# Patient Record
Sex: MALE | Race: WHITE | NOT HISPANIC OR LATINO | Employment: OTHER | URBAN - METROPOLITAN AREA
[De-identification: names, ages, dates, MRNs, and addresses within clinical notes are randomized per-mention and may not be internally consistent; named-entity substitution may affect disease eponyms.]

---

## 2017-01-06 ENCOUNTER — OFFICE VISIT (OUTPATIENT)
Dept: MEDICAL GROUP | Facility: MEDICAL CENTER | Age: 67
End: 2017-01-06
Payer: MEDICARE

## 2017-01-06 VITALS
HEIGHT: 67 IN | SYSTOLIC BLOOD PRESSURE: 128 MMHG | TEMPERATURE: 96.8 F | OXYGEN SATURATION: 94 % | WEIGHT: 238 LBS | DIASTOLIC BLOOD PRESSURE: 74 MMHG | HEART RATE: 74 BPM | BODY MASS INDEX: 37.35 KG/M2

## 2017-01-06 DIAGNOSIS — K57.30 DIVERTICULOSIS OF LARGE INTESTINE WITHOUT HEMORRHAGE: ICD-10-CM

## 2017-01-06 DIAGNOSIS — Z00.00 MEDICARE ANNUAL WELLNESS VISIT, SUBSEQUENT: ICD-10-CM

## 2017-01-06 DIAGNOSIS — I10 ESSENTIAL HYPERTENSION: ICD-10-CM

## 2017-01-06 DIAGNOSIS — Z97.0 PRESENCE OF ARTIFICIAL LEFT EYE: ICD-10-CM

## 2017-01-06 DIAGNOSIS — Z12.5 PROSTATE CANCER SCREENING: ICD-10-CM

## 2017-01-06 DIAGNOSIS — E66.9 OBESITY (BMI 30-39.9): ICD-10-CM

## 2017-01-06 DIAGNOSIS — I45.10 RBBB (RIGHT BUNDLE BRANCH BLOCK): ICD-10-CM

## 2017-01-06 DIAGNOSIS — R74.8 ELEVATED LIVER ENZYMES: ICD-10-CM

## 2017-01-06 DIAGNOSIS — R73.01 ELEVATED FASTING BLOOD SUGAR: ICD-10-CM

## 2017-01-06 DIAGNOSIS — D17.0 LIPOMA OF NECK: ICD-10-CM

## 2017-01-06 DIAGNOSIS — Z23 NEED FOR SHINGLES VACCINE: ICD-10-CM

## 2017-01-06 DIAGNOSIS — R06.83 SNORING: ICD-10-CM

## 2017-01-06 DIAGNOSIS — Z23 NEED FOR PNEUMOCOCCAL VACCINATION: ICD-10-CM

## 2017-01-06 DIAGNOSIS — N52.9 ERECTILE DYSFUNCTION, UNSPECIFIED ERECTILE DYSFUNCTION TYPE: ICD-10-CM

## 2017-01-06 PROCEDURE — 90732 PPSV23 VACC 2 YRS+ SUBQ/IM: CPT | Performed by: FAMILY MEDICINE

## 2017-01-06 PROCEDURE — G0439 PPPS, SUBSEQ VISIT: HCPCS | Mod: 25 | Performed by: FAMILY MEDICINE

## 2017-01-06 PROCEDURE — G0009 ADMIN PNEUMOCOCCAL VACCINE: HCPCS | Performed by: FAMILY MEDICINE

## 2017-01-06 RX ORDER — TADALAFIL 5 MG/1
5 TABLET ORAL DAILY
Qty: 90 TAB | Refills: 3 | Status: SHIPPED | OUTPATIENT
Start: 2017-01-06 | End: 2018-01-22 | Stop reason: SDUPTHER

## 2017-01-06 RX ORDER — AMLODIPINE BESYLATE AND BENAZEPRIL HYDROCHLORIDE 5; 20 MG/1; MG/1
2 CAPSULE ORAL DAILY
Qty: 180 CAP | Refills: 3 | Status: SHIPPED | OUTPATIENT
Start: 2017-01-06 | End: 2017-12-28 | Stop reason: SDUPTHER

## 2017-01-06 RX ORDER — HYDROCHLOROTHIAZIDE 25 MG/1
25 TABLET ORAL DAILY
Qty: 90 TAB | Refills: 3 | Status: SHIPPED | OUTPATIENT
Start: 2017-01-06 | End: 2017-12-28 | Stop reason: SDUPTHER

## 2017-01-06 ASSESSMENT — PATIENT HEALTH QUESTIONNAIRE - PHQ9: CLINICAL INTERPRETATION OF PHQ2 SCORE: 0

## 2017-01-06 NOTE — MR AVS SNAPSHOT
"        Stas Gann   2017 4:00 PM   Office Visit   MRN: [id number]    Department:  [facility]   Dept Phone:  [phone]    Description:  Male : 1950   Provider:  Farhan Guy M.D.           Reason for Visit     Annual Exam           Allergies as of 2017     Allergen Noted Reactions    Nkda [No Known Drug Allergy] 2014         You were diagnosed with     Medicare annual wellness visit, subsequent   [945409]       Essential hypertension   [6565423]       Obesity (BMI 30-39.9)   [314444]       RBBB (right bundle branch block)   [163116]       Elevated fasting blood sugar   [249898]       Elevated liver enzymes   [997916]       Presence of artificial left eye   [662763]       Diverticulosis of large intestine without hemorrhage   [5742699]       Snoring   [264464]       Erectile dysfunction, unspecified erectile dysfunction type   [6527959]       Lipoma of neck   [510709]       Need for pneumococcal vaccination   [149448]       Need for shingles vaccine   [823621]       Prostate cancer screening   [722857]         Vital Signs     Blood Pressure Pulse Temperature Height Weight Body Mass Index    128/74 mmHg 74 36 °C (96.8 °F) 1.702 m (5' 7\") 107.956 kg (238 lb) 37.27 kg/m2    Oxygen Saturation Smoking Status                94% Former Smoker          Basic Information     Date Of Birth Sex Race Ethnicity Preferred Language    1950 Male White Non- English      Problem List              ICD-10-CM Priority Class Noted - Resolved    HTN (hypertension) I10   2014 - Present    Snoring R06.83   2014 - Present    RBBB (right bundle branch block) I45.10   2014 - Present    Presence of artificial left eye Z97.0   2014 - Present    Hx of Elevated liver enzymes R74.8   2014 - Present    Elevated fasting blood sugar R73.01   2014 - Present    ED (erectile dysfunction) N52.9   2014 - Present    Hx of Diverticulosis of colon K57.30   2015 - " Present    Obesity (BMI 30-39.9) E66.9   1/6/2017 - Present    Lipoma of neck D17.0   1/6/2017 - Present      Health Maintenance        Date Due Completion Dates    IMM ZOSTER VACCINE 1/24/2008 ---    IMM PNEUMOCOCCAL 65+ (ADULT) LOW/MEDIUM RISK SERIES (2 of 2 - PPSV23) 12/18/2015 12/18/2014    COLONOSCOPY 8/13/2024 8/13/2014    IMM DTaP/Tdap/Td Vaccine (2 - Td) 9/3/2025 9/3/2015            Current Immunizations     13-VALENT PCV PREVNAR 12/18/2014    Influenza TIV (IM) 9/14/2015    Influenza Vaccine Adult HD 10/31/2016    Pneumococcal polysaccharide vaccine (PPSV-23) 1/6/2017    Tdap Vaccine 9/3/2015  4:21 PM      Below and/or attached are the medications your provider expects you to take. Review all of your home medications and newly ordered medications with your provider and/or pharmacist. Follow medication instructions as directed by your provider and/or pharmacist. Please keep your medication list with you and share with your provider. Update the information when medications are discontinued, doses are changed, or new medications (including over-the-counter products) are added; and carry medication information at all times in the event of emergency situations     Allergies:  NKDA - (reactions not documented)               Medications  Valid as of: January 06, 2017 -  4:55 PM    Generic Name Brand Name Tablet Size Instructions for use    Amlodipine Besy-Benazepril HCl (Cap) LOTREL 5-20 MG Take 2 Caps by mouth every day.        HydroCHLOROthiazide (Tab) HYDRODIURIL 25 MG Take 1 Tab by mouth every day.        Tadalafil (Tab) CIALIS 5 MG Take 1 Tab by mouth every day.        Zoster Vaccine Live (Recon Soln) ZOSTAVAX 35487 UNT/0.65ML Inject 0.65 mL as instructed Once for 1 dose. Please fax when administered so we can update our records        .                 Medicines prescribed today were sent to:     [id number] - [place] - [address]    [address] [place] [address]    Phone:  [phone] Fax: [phone]    Open 24 Hours?: No    [facility] - [place], [place] - [address]    [address] [address]    Phone: [phone] Fax: [phone]    Open 24 Hours?: No      Medication refill instructions:       If your prescription bottle indicates you have medication refills left, it is not necessary to call your provider’s office. Please contact your pharmacy and they will refill your medication.    If your prescription bottle indicates you do not have any refills left, you may request refills at any time through one of the following ways: The online [employer] system (except Urgent Care), by calling your provider’s office, or by asking your pharmacy to contact your provider’s office with a refill request. Medication refills are processed only during regular business hours and may not be available until the next business day. Your provider may request additional information or to have a follow-up visit with you prior to refilling your medication.   *Please Note: Medication refills are assigned a new Rx number when refilled electronically. Your pharmacy may indicate that no refills were authorized even though a new prescription for the same medication is available at the pharmacy. Please request the medicine by name with the pharmacy before contacting your provider for a refill.        Your To Do List     Future Labs/Procedures Complete By Expires    CBC WITH DIFFERENTIAL  As directed 1/7/2018    COMP METABOLIC PANEL  As directed 1/7/2018    HEMOGLOBIN A1C  As directed 1/7/2018    LIPID PROFILE  As directed 1/7/2018    PROSTATE SPECIFIC AG SCREENING  As directed 1/7/2018    TSH WITH REFLEX TO FT4  As directed 1/6/2018      Referral     A referral request has been sent to our patient care coordination department. Please allow 3-5 business days for us to process this request and contact you either by phone or mail. If you do not hear from us by the 5th  business day, please call us at [phone].           [employer] Access Code: Activation code not generated  Current [employer] Status: Active

## 2017-01-07 NOTE — PROGRESS NOTES
Chief Complaint   Patient presents with   • Annual Exam         HPI:  Stas is a 66 y.o. here for Medicare Annual Wellness Visit     Patient has recurrent lipoma on posterior neck that he would like treated. He states it is bothersome.  Patient has been going to the gym and lifting weights and doing cardio regularly. He has lost about 20 pounds in last 2 years.      Patient Active Problem List    Diagnosis Date Noted   • Obesity (BMI 30-39.9) 01/06/2017   • Lipoma of neck 01/06/2017   • Hx of Diverticulosis of colon 01/08/2015   • ED (erectile dysfunction) 12/18/2014   • Hx of Elevated liver enzymes 05/06/2014   • Elevated fasting blood sugar 05/06/2014   • HTN (hypertension) 04/29/2014   • Snoring 04/29/2014   • RBBB (right bundle branch block) 04/29/2014   • Presence of artificial left eye 04/29/2014       Current Outpatient Prescriptions   Medication Sig Dispense Refill   • hydrochlorothiazide (HYDRODIURIL) 25 MG Tab Take 1 Tab by mouth every day. 90 Tab 3   • amlodipine-benazepril (LOTREL) 5-20 MG per capsule Take 2 Caps by mouth every day. 180 Cap 3   • tadalafil (CIALIS) 5 MG tablet Take 1 Tab by mouth every day. 90 Tab 3   • zoster vaccine live, PF, (ZOSTAVAX) [id number] UNT/0.65ML injection Inject 0.65 mL as instructed Once for 1 dose. Please fax when administered so we can update our records 0.65 mL 0     No current facility-administered medications for this visit.            Current supplements as per medication list.       Allergies: Nkda    Current social contact/activities: part of single clubs.    He  reports that he quit smoking about 36 years ago. His smoking use included Cigarettes. He has a 10 pack-year smoking history. He has never used smokeless tobacco. He reports that he does not drink alcohol or use illicit drugs.  Counseling given: Not Answered        DPA/Advanced directive: Patient does not have an advanced directive. If not on file, instructed to bring in a copy to scan into their chart. If no  advanced directive exists, a packet and workshop information was given on advanced directives.    ROS:    Gait: Uses no assistive device   Ostomy: no   Other tubes: no   Amputations: no   Chronic oxygen use no   Last eye exam: few years.   : Denies incontinence.       Depression Screening    Little interest or pleasure in doing things?  0 - not at all  Feeling down, depressed , or hopeless? 0 - not at all  Trouble falling or staying asleep, or sleeping too much?     Feeling tired or having little energy?     Poor appetite or overeating?     Feeling bad about yourself - or that you are a failure or have let yourself or your family down?    Trouble concentrating on things, such as reading the newspaper or watching television?    Moving or speaking so slowly that other people could have noticed.  Or the opposite - being so fidgety or restless that you have been moving around a lot more than usual?     Thoughts that you would be better off dead, or of hurting yourself?     Patient Health Questionnaire Score:      If depressive symptoms identified deferred to follow up visit unless specifically addressed in assesment and plan.      Screening for Cognitive Impairment    Three Minute Recall (banana, sunrise, fence)  3/3    Draw clock face with all 12 numbers set to the hand to show 10 minures past 11 o'clock       Cognitive concerns identified defferred for follow up unless specifically addressed in assesment and plan.    Fall Risk Assessment    Has the patient had two or more falls in the last year or any fall with injury in the last year?  No    Safety Assessment    Throw rugs on floor.  No  Handrails on all stairs.  No  Good lighting in all hallways.  Yes  Difficulty hearing.  No  Patient counseled about all safety risks that were identified.    Functional Assessment ADLs    Are there any barriers preventing you from cooking for yourself or meeting nutritional needs?  No.    Are there any barriers preventing you from  "driving safely or obtaining transportation?  No.    Are there any barriers preventing you from using a telephone or calling for help?  No.    Are there any barriers preventing you from shopping?  No.    Are there any barriers preventing you from taking care of your own finances?  No.    Are there any barriers preventing you from managing your medications?  No.    Are currently engaing any exercise or physical activity?  Yes.       Health Maintenance Summary                IMM ZOSTER VACCINE Overdue 1/24/2008     IMM PNEUMOCOCCAL 65+ (ADULT) LOW/MEDIUM RISK SERIES Overdue 12/18/2015      Done 12/18/2014 Imm Admin: Pneumococcal Conjugate Vaccine (Prevnar/PCV-13)    Annual Wellness Visit Overdue 9/2/2016      Done 9/2/2015 Visit Dx: Encounter for Medicare annual wellness exam    COLONOSCOPY Next Due 8/13/2024      Done 8/13/2014 AMB REFERRAL TO GI FOR COLONOSCOPY    IMM DTaP/Tdap/Td Vaccine Next Due 9/3/2025      Done 9/3/2015 Imm Admin: Tdap Vaccine          Patient Care Team:  Farhan Guy M.D. as PCP - General (Family Medicine)      Social History   Substance Use Topics   • Smoking status: Former Smoker -- 1.00 packs/day for 10 years     Types: Cigarettes     Quit date: 09/02/1980   • Smokeless tobacco: Never Used      Comment: Quit 1980   • Alcohol Use: No     History reviewed. No pertinent family history.  He  has a past medical history of Hypertension.   Past Surgical History   Procedure Laterality Date   • Mass excision general  5/19/2009     Performed by CARSON BRAND at SURGERY SAME DAY [facility] ORS       Exam:     Blood pressure 128/74, pulse 74, temperature 36 °C (96.8 °F), height 1.702 m (5' 7\"), weight 107.956 kg (238 lb), SpO2 94 %. Body mass index is 37.27 kg/(m^2).    Constitutional: Alert, no distress.  Skin: Warm, dry, good turgor, no rashes in visible areas. 5 cm right 3 cm soft, mobile, non-erythematous, nontender soft tissue mass on posterior neck. Moderate sized polyp behind left ear.  Eye: " Equal, round and reactive, conjunctiva clear, lids normal.  ENMT: Lips without lesions, good dentition, oropharynx clear.  Respiratory: Unlabored respiratory effort, lungs clear to auscultation, no wheezes, no ronchi.  Cardiovascular: Normal S1, S2, no murmur, no edema.  Psych: Alert and oriented x3, normal affect and mood.        Assessment and Plan. The following treatment and monitoring plan is recommended:    1. Medicare annual wellness visit, subsequent  - Annual Wellness Visit - Includes PPPS Subsequent ()    2. Essential hypertension  Controlled. Continue current medication. Follow up annually with labs.  - hydrochlorothiazide (HYDRODIURIL) 25 MG Tab; Take 1 Tab by mouth every day.  Dispense: 90 Tab; Refill: 3  - amlodipine-benazepril (LOTREL) 5-20 MG per capsule; Take 2 Caps by mouth every day.  Dispense: 180 Cap; Refill: 3  - COMP METABOLIC PANEL; Future  - LIPID PROFILE; Future  - TSH WITH REFLEX TO FT4; Future  - CBC WITH DIFFERENTIAL; Future  - Annual Wellness Visit - Includes PPPS Subsequent ()    3. Obesity (BMI 30-39.9)  - Patient identified as having weight management issue.  Appropriate orders and counseling given.  - Annual Wellness Visit - Includes PPPS Subsequent ()    4. RBBB (right bundle branch block)  Asymptomatic.  - Annual Wellness Visit - Includes PPPS Subsequent ()    5. Elevated fasting blood sugar  Improved. Continue exercise.  - HEMOGLOBIN A1C; Future  - Annual Wellness Visit - Includes PPPS Subsequent ()    6. Hx of Elevated liver enzymes  - Annual Wellness Visit - Includes PPPS Subsequent ()    7. Presence of artificial left eye  - Annual Wellness Visit - Includes PPPS Subsequent ()    8. Diverticulosis of large intestine without hemorrhage  Asymptomatic.  - Annual Wellness Visit - Includes PPPS Subsequent ()    9. Snoring  Improved after weight loss.  - Annual Wellness Visit - Includes PPPS Subsequent ()    10. Erectile dysfunction,  unspecified erectile dysfunction type  Controlled with Cialis 5 mg daily.  - Annual Wellness Visit - Includes PPPS Subsequent ()    11. Lipoma of neck  - REFERRAL TO DERMATOLOGY  - Annual Wellness Visit - Includes PPPS Subsequent ()    12. Need for pneumococcal vaccination  - PNEUMOCOCCAL POLYSACCHARIDE VACCINE 23-VALENT =>1YO SQ/IM  - Annual Wellness Visit - Includes PPPS Subsequent ()    13. Need for shingles vaccine  - zoster vaccine live, PF, (ZOSTAVAX) [id number] UNT/0.65ML injection; Inject 0.65 mL as instructed Once for 1 dose. Please fax when administered so we can update our records  Dispense: 0.65 mL; Refill: 0  - Annual Wellness Visit - Includes PPPS Subsequent ()    14. Prostate cancer screening  - PROSTATE SPECIFIC AG SCREENING; Future  - Annual Wellness Visit - Includes PPPS Subsequent ()        Services needed: as per orders if indicated.  Health Care Screening: Age-appropriate preventive services Medicare covers discussed today and ordered if indicated.    Referrals offered: Community-based lifestyle interventions to reduce health risks and promote self-management and wellness, fall prevention, nutrition, physical activity, tobacco-use cessation, weight loss, and mental health services as per orders if indicated.    Discussion today about general wellness and lifestyle habits:    · Prevent falls and reduce trip hazards; Cautioned about securing or removing rugs.  · Have a working fire alarm and carbon monoxide detector;   · Engage in regular physical activity and social activities       Follow-up: Return in about 1 year (around 1/6/2018) for Annual, Long.

## 2017-06-07 ENCOUNTER — RX ONLY (OUTPATIENT)
Age: 67
Setting detail: RX ONLY
End: 2017-06-07

## 2017-06-21 PROBLEM — D49.2 NEOPLASM OF UNSPECIFIED BEHAVIOR OF BONE, SOFT TISSUE, AND SKIN: Status: RESOLVED | Noted: 2017-06-07 | Resolved: 2017-06-21

## 2017-07-11 ENCOUNTER — RX ONLY (OUTPATIENT)
Age: 67
Setting detail: RX ONLY
End: 2017-07-11

## 2017-12-22 ENCOUNTER — PATIENT OUTREACH (OUTPATIENT)
Dept: HEALTH INFORMATION MANAGEMENT | Facility: OTHER | Age: 67
End: 2017-12-22

## 2017-12-22 NOTE — PROGRESS NOTES
1. Attempt #: Final    2. HealthConnect Verified: no    3. Verify PCP: yes    4. Care Team Updated:       •   DME Company (gait device, O2, CPAP, etc.): NO       •   Other Specialists (eye doctor, derm, GYN, cardiology, endo, etc): YES    5.  Reviewed/Updated the following with patient:       •   Communication Preference Obtained? YES       •   Preferred Pharmacy? YES       •   Preferred Lab? YES       •   Family History (document living status of immediate family members and if + hx of cancer, diabetes, hypertension, hyperlipidemia, heart attack, stroke) YES. Was Abstract Encounter opened and chart updated? YES // However pt said that he was adopted and doesn't know his family history.    6. [employer] Activation: already active    7. [employer] Fernando: yes    8. Annual Wellness Visit Scheduling  Scheduling Status:Scheduled      9. Care Gap Scheduling (Attempt to Schedule EACH Overdue Care Gap!)     Health Maintenance Due   Topic Date Due   • IMM ZOSTER VACCINE  01/24/2008   • IMM INFLUENZA (1) 09/01/2017        Scheduled patient for Annual Wellness Visit      10. Patient was advised: “This is a free wellness visit. The provider will screen for medical conditions to help you stay healthy. If you have other concerns to address you may be asked to discuss these at a separate visit or there may be an additional fee.”     11. Patient was informed to arrive 15 min prior to their scheduled appointment and bring in their medication bottles.

## 2017-12-28 DIAGNOSIS — I10 ESSENTIAL HYPERTENSION: ICD-10-CM

## 2017-12-28 RX ORDER — AMLODIPINE BESYLATE AND BENAZEPRIL HYDROCHLORIDE 5; 20 MG/1; MG/1
2 CAPSULE ORAL DAILY
Qty: 180 CAP | Refills: 3 | Status: SHIPPED | OUTPATIENT
Start: 2017-12-28 | End: 2018-01-16 | Stop reason: SDUPTHER

## 2017-12-28 RX ORDER — HYDROCHLOROTHIAZIDE 25 MG/1
25 TABLET ORAL DAILY
Qty: 90 TAB | Refills: 3 | Status: SHIPPED | OUTPATIENT
Start: 2017-12-28 | End: 2019-02-21

## 2017-12-28 NOTE — TELEPHONE ENCOUNTER
----- Message from Stas Gann sent at 12/28/2017  8:46 AM PST -----  Regarding: Prescription Question  Contact: [phone]  Hello, can you contact [facility] and renew my meds for my high blood pressure?    Thanks, Stas Gann

## 2018-01-15 ENCOUNTER — TELEPHONE (OUTPATIENT)
Dept: MEDICAL GROUP | Facility: MEDICAL CENTER | Age: 68
End: 2018-01-15

## 2018-01-15 NOTE — TELEPHONE ENCOUNTER
PVP WITH OUTREACH  Future Appointments       Provider Department Center    1/22/2018 2:00 PM Farhan Guy M.D.; [facility]  [facility]          ANNUAL WELLNESS VISIT PRE-VISIT PLANNING     1.  Immunizations were updated in Epic using WebIZ?: Yes       •  WebIZ Recommendations: TD and ZOSTAVAX (Shingles)       •  Is patient due for Tdap? NO       •  Is patient due for Shingles? NO     2.  Specialty Comments was updated with diagnosis information provided by [facility]: NO

## 2018-01-16 ENCOUNTER — PATIENT MESSAGE (OUTPATIENT)
Dept: MEDICAL GROUP | Facility: MEDICAL CENTER | Age: 68
End: 2018-01-16

## 2018-01-16 ENCOUNTER — HOSPITAL ENCOUNTER (OUTPATIENT)
Dept: LAB | Facility: MEDICAL CENTER | Age: 68
End: 2018-01-16
Attending: FAMILY MEDICINE
Payer: MEDICARE

## 2018-01-16 DIAGNOSIS — Z12.5 PROSTATE CANCER SCREENING: ICD-10-CM

## 2018-01-16 DIAGNOSIS — R73.01 ELEVATED FASTING BLOOD SUGAR: ICD-10-CM

## 2018-01-16 DIAGNOSIS — I10 ESSENTIAL HYPERTENSION: ICD-10-CM

## 2018-01-16 LAB
ALBUMIN SERPL BCP-MCNC: 4.5 G/DL (ref 3.2–4.9)
ALBUMIN/GLOB SERPL: 2 G/DL
ALP SERPL-CCNC: 50 U/L (ref 30–99)
ALT SERPL-CCNC: 18 U/L (ref 2–50)
ANION GAP SERPL CALC-SCNC: 7 MMOL/L (ref 0–11.9)
AST SERPL-CCNC: 19 U/L (ref 12–45)
BASOPHILS # BLD AUTO: 1 % (ref 0–1.8)
BASOPHILS # BLD: 0.06 K/UL (ref 0–0.12)
BILIRUB SERPL-MCNC: 0.7 MG/DL (ref 0.1–1.5)
BUN SERPL-MCNC: 15 MG/DL (ref 8–22)
CALCIUM SERPL-MCNC: 9 MG/DL (ref 8.5–10.5)
CHLORIDE SERPL-SCNC: 104 MMOL/L (ref 96–112)
CHOLEST SERPL-MCNC: 158 MG/DL (ref 100–199)
CO2 SERPL-SCNC: 28 MMOL/L (ref 20–33)
CREAT SERPL-MCNC: 1.05 MG/DL (ref 0.5–1.4)
EOSINOPHIL # BLD AUTO: 0.17 K/UL (ref 0–0.51)
EOSINOPHIL NFR BLD: 2.8 % (ref 0–6.9)
ERYTHROCYTE [DISTWIDTH] IN BLOOD BY AUTOMATED COUNT: 39.2 FL (ref 35.9–50)
EST. AVERAGE GLUCOSE BLD GHB EST-MCNC: 120 MG/DL
GLOBULIN SER CALC-MCNC: 2.2 G/DL (ref 1.9–3.5)
GLUCOSE SERPL-MCNC: 115 MG/DL (ref 65–99)
HBA1C MFR BLD: 5.8 % (ref 0–5.6)
HCT VFR BLD AUTO: 46.5 % (ref 42–52)
HDLC SERPL-MCNC: 40 MG/DL
HGB BLD-MCNC: 16 G/DL (ref 14–18)
IMM GRANULOCYTES # BLD AUTO: 0.02 K/UL (ref 0–0.11)
IMM GRANULOCYTES NFR BLD AUTO: 0.3 % (ref 0–0.9)
LDLC SERPL CALC-MCNC: 92 MG/DL
LYMPHOCYTES # BLD AUTO: 2.45 K/UL (ref 1–4.8)
LYMPHOCYTES NFR BLD: 40.9 % (ref 22–41)
MCH RBC QN AUTO: 29 PG (ref 27–33)
MCHC RBC AUTO-ENTMCNC: 34.4 G/DL (ref 33.7–35.3)
MCV RBC AUTO: 84.2 FL (ref 81.4–97.8)
MONOCYTES # BLD AUTO: 0.57 K/UL (ref 0–0.85)
MONOCYTES NFR BLD AUTO: 9.5 % (ref 0–13.4)
NEUTROPHILS # BLD AUTO: 2.72 K/UL (ref 1.82–7.42)
NEUTROPHILS NFR BLD: 45.5 % (ref 44–72)
NRBC # BLD AUTO: 0 K/UL
NRBC BLD-RTO: 0 /100 WBC
PLATELET # BLD AUTO: 227 K/UL (ref 164–446)
PMV BLD AUTO: 10.2 FL (ref 9–12.9)
POTASSIUM SERPL-SCNC: 3.9 MMOL/L (ref 3.6–5.5)
PROT SERPL-MCNC: 6.7 G/DL (ref 6–8.2)
PSA SERPL-MCNC: 6.27 NG/ML (ref 0–4)
RBC # BLD AUTO: 5.52 M/UL (ref 4.7–6.1)
SODIUM SERPL-SCNC: 139 MMOL/L (ref 135–145)
TRIGL SERPL-MCNC: 128 MG/DL (ref 0–149)
TSH SERPL DL<=0.005 MIU/L-ACNC: 2.8 UIU/ML (ref 0.38–5.33)
WBC # BLD AUTO: 6 K/UL (ref 4.8–10.8)

## 2018-01-16 PROCEDURE — 84443 ASSAY THYROID STIM HORMONE: CPT

## 2018-01-16 PROCEDURE — 80061 LIPID PANEL: CPT

## 2018-01-16 PROCEDURE — 85025 COMPLETE CBC W/AUTO DIFF WBC: CPT

## 2018-01-16 PROCEDURE — 84153 ASSAY OF PSA TOTAL: CPT | Mod: GA

## 2018-01-16 PROCEDURE — 83036 HEMOGLOBIN GLYCOSYLATED A1C: CPT | Mod: GA

## 2018-01-16 PROCEDURE — 36415 COLL VENOUS BLD VENIPUNCTURE: CPT | Mod: GA

## 2018-01-16 PROCEDURE — 80053 COMPREHEN METABOLIC PANEL: CPT

## 2018-01-16 RX ORDER — AMLODIPINE BESYLATE AND BENAZEPRIL HYDROCHLORIDE 5; 20 MG/1; MG/1
2 CAPSULE ORAL DAILY
Qty: 180 CAP | Refills: 0 | Status: SHIPPED | OUTPATIENT
Start: 2018-01-16 | End: 2018-01-16 | Stop reason: SDUPTHER

## 2018-01-16 NOTE — TELEPHONE ENCOUNTER
Demarcus Mcclelland I sent in a request for the Amlodipine to [facility]. Submitted to Dr. Guy for approval

## 2018-01-16 NOTE — TELEPHONE ENCOUNTER
From: Stas Gann  To: Farhan Guy M.D.  Sent: 1/16/2018 3:38 PM PST  Subject: Prescription Question    Hello, [facility] will be contacting you for my refill of AMLODIPINE/BENAZPRIL     Thanks, Stas Gann

## 2018-01-17 RX ORDER — AMLODIPINE BESYLATE AND BENAZEPRIL HYDROCHLORIDE 5; 20 MG/1; MG/1
CAPSULE ORAL
Qty: 180 CAP | Refills: 3 | Status: SHIPPED | OUTPATIENT
Start: 2018-01-17 | End: 2018-02-20 | Stop reason: SDUPTHER

## 2018-01-17 NOTE — TELEPHONE ENCOUNTER
Refill done. Patient is due for annual appointment. Please have patient schedule.  Farhan Guy M.D.

## 2018-01-22 ENCOUNTER — OFFICE VISIT (OUTPATIENT)
Dept: MEDICAL GROUP | Facility: MEDICAL CENTER | Age: 68
End: 2018-01-22
Payer: MEDICARE

## 2018-01-22 VITALS
DIASTOLIC BLOOD PRESSURE: 80 MMHG | TEMPERATURE: 97.6 F | HEIGHT: 67 IN | HEART RATE: 68 BPM | BODY MASS INDEX: 40.49 KG/M2 | SYSTOLIC BLOOD PRESSURE: 138 MMHG | WEIGHT: 258 LBS | OXYGEN SATURATION: 96 %

## 2018-01-22 DIAGNOSIS — K57.30 DIVERTICULOSIS OF COLON: ICD-10-CM

## 2018-01-22 DIAGNOSIS — Z97.0 PRESENCE OF ARTIFICIAL LEFT EYE: ICD-10-CM

## 2018-01-22 DIAGNOSIS — R73.01 ELEVATED FASTING BLOOD SUGAR: ICD-10-CM

## 2018-01-22 DIAGNOSIS — Z00.00 MEDICARE ANNUAL WELLNESS VISIT, SUBSEQUENT: ICD-10-CM

## 2018-01-22 DIAGNOSIS — R06.83 SNORING: ICD-10-CM

## 2018-01-22 DIAGNOSIS — I10 ESSENTIAL HYPERTENSION: ICD-10-CM

## 2018-01-22 DIAGNOSIS — I45.10 RBBB (RIGHT BUNDLE BRANCH BLOCK): ICD-10-CM

## 2018-01-22 DIAGNOSIS — N52.9 ERECTILE DYSFUNCTION, UNSPECIFIED ERECTILE DYSFUNCTION TYPE: ICD-10-CM

## 2018-01-22 DIAGNOSIS — R74.8 ELEVATED LIVER ENZYMES: ICD-10-CM

## 2018-01-22 DIAGNOSIS — E66.01 MORBID OBESITY WITH BMI OF 40.0-44.9, ADULT (HCC): ICD-10-CM

## 2018-01-22 DIAGNOSIS — R97.20 ELEVATED PSA, LESS THAN 10 NG/ML: ICD-10-CM

## 2018-01-22 PROBLEM — E66.9 OBESITY (BMI 30-39.9): Status: RESOLVED | Noted: 2017-01-06 | Resolved: 2018-01-22

## 2018-01-22 PROBLEM — D17.0 LIPOMA OF NECK: Status: RESOLVED | Noted: 2017-01-06 | Resolved: 2018-01-22

## 2018-01-22 PROCEDURE — G0439 PPPS, SUBSEQ VISIT: HCPCS | Performed by: FAMILY MEDICINE

## 2018-01-22 RX ORDER — ASCORBIC ACID 500 MG
500 TABLET ORAL DAILY
COMMUNITY
End: 2022-03-23

## 2018-01-22 RX ORDER — TADALAFIL 5 MG/1
5 TABLET ORAL DAILY
Qty: 90 TAB | Refills: 3 | Status: SHIPPED | OUTPATIENT
Start: 2018-01-22 | End: 2019-02-21 | Stop reason: SDUPTHER

## 2018-01-22 ASSESSMENT — PATIENT HEALTH QUESTIONNAIRE - PHQ9: CLINICAL INTERPRETATION OF PHQ2 SCORE: 0

## 2018-01-22 NOTE — PROGRESS NOTES
Chief Complaint   Patient presents with   • Annual Wellness Visit         HPI:  Stas is a 67 y.o. here for Medicare Annual Wellness Visit        Patient Active Problem List    Diagnosis Date Noted   • Morbid obesity with BMI of 40.0-44.9, adult (HCC) 01/22/2018   • Elevated PSA, less than 10 ng/ml 01/22/2018   • Hx of Diverticulosis of colon 01/08/2015   • ED (erectile dysfunction) 12/18/2014   • Hx of Elevated liver enzymes 05/06/2014   • Elevated fasting blood sugar 05/06/2014   • HTN (hypertension) 04/29/2014   • Snoring 04/29/2014   • RBBB (right bundle branch block) 04/29/2014   • Presence of artificial left eye 04/29/2014       Current Outpatient Prescriptions   Medication Sig Dispense Refill   • ascorbic acid (ASCORBIC ACID) 500 MG Tab Take 500 mg by mouth every day.     • tadalafil (CIALIS) 5 MG tablet Take 1 Tab by mouth every day. 90 Tab 3   • amlodipine-benazepril (LOTREL) 5-20 MG per capsule TAKE 2 CAPSULES DAILY 180 Cap 3   • hydrochlorothiazide (HYDRODIURIL) 25 MG Tab Take 1 Tab by mouth every day. 90 Tab 3     No current facility-administered medications for this visit.         Patient is taking medications as noted in medication list.  Current supplements as per medication list.     Allergies: Nkda [no known drug allergy]    Current social contact/activities: pt keeps himself busy with a singles group.     Is patient current with immunizations? No, due for ZOSTAVAX (Shingles). Patient is interested in receiving NONE today.    He  reports that he quit smoking about 37 years ago. His smoking use included Cigarettes. He has a 10.00 pack-year smoking history. He has never used smokeless tobacco. He reports that he does not drink alcohol or use drugs.  Counseling given: Not Answered        DPA/Advanced directive: Patient does not have an Advanced Directive.  A packet and workshop information was given on Advanced Directives.    ROS:    Gait: Uses no assistive device   Ostomy: no   Other tubes: no    Amputations: no   Chronic oxygen use no   Last eye exam 5 years ago    Wears hearing aids: no   : Denies any urinary leakage during the last 6 months        Depression Screening    Little interest or pleasure in doing things?  0 - not at all  Feeling down, depressed, or hopeless? 0 - not at all  Patient Health Questionnaire Score: 0    If depressive symptoms identified deferred to follow up visit unless specifically addressed in assessment and plan.    Interpretation of PHQ-9 Total Score   Score Severity   1-4 No Depression   5-9 Mild Depression   10-14 Moderate Depression   15-19 Moderately Severe Depression   20-27 Severe Depression    Screening for Cognitive Impairment    Three Minute Recall (apple, watch, sergey)  3/3    Draw clock face with all 12 numbers set to the hand to show 10 minutes past 11 o'clock  1 3/5  If cognitive concerns identified, deferred for follow up unless specifically addressed in assessment and plan.    Fall Risk Assessment    Has the patient had two or more falls in the last year or any fall with injury in the last year?  No  If fall risk identified, deferred for follow up unless specifically addressed in assessment and plan.    Safety Assessment    Throw rugs on floor.  Yes  Handrails on all stairs.  Yes  Good lighting in all hallways.  Yes  Difficulty hearing.  No  Patient counseled about all safety risks that were identified.    Functional Assessment ADLs    Are there any barriers preventing you from cooking for yourself or meeting nutritional needs?  No.    Are there any barriers preventing you from driving safely or obtaining transportation?  No.    Are there any barriers preventing you from using a telephone or calling for help?  No.    Are there any barriers preventing you from shopping?  No.    Are there any barriers preventing you from taking care of your own finances?  No.    Are there any barriers preventing you from managing your medications?  No.    Are you currently  "engaging any exercise or physical activity?  Yes.  Pt goes to [employer] 3-4 times a week.     Health Maintenance Summary                IMM ZOSTER VACCINE Overdue 1/24/2008     Annual Wellness Visit Overdue 1/7/2018      Done 1/6/2017 Visit Dx: Medicare annual wellness visit, subsequent     Patient has more history with this topic...    COLONOSCOPY Next Due 8/13/2024      Done 8/13/2014 AMB REFERRAL TO GI FOR COLONOSCOPY    IMM DTaP/Tdap/Td Vaccine Next Due 9/3/2025      Done 9/3/2015 Imm Admin: Tdap Vaccine          Patient Care Team:  Farhan Guy M.D. as PCP - General (Family Medicine)    Social History   Substance Use Topics   • Smoking status: Former Smoker     Packs/day: 1.00     Years: 10.00     Types: Cigarettes     Quit date: 9/2/1980   • Smokeless tobacco: Never Used      Comment: Quit 1980   • Alcohol use No     History reviewed. No pertinent family history.  He  has a past medical history of Hypertension.   Past Surgical History:   Procedure Laterality Date   • MASS EXCISION GENERAL  5/19/2009    Performed by CARSON BRAND at SURGERY SAME DAY [facility] ORS           Exam:     Blood pressure 138/80, pulse 68, temperature 36.4 °C (97.6 °F), height 1.702 m (5' 7\"), weight 117 kg (258 lb), SpO2 96 %. Body mass index is 40.41 kg/m².    Constitutional: Alert, no distress.  Skin: Warm, dry, good turgor, no rashes in visible areas.  Eye: Equal, round and reactive, conjunctiva clear, lids normal.  ENMT: Lips without lesions, good dentition, oropharynx clear.  Neck: Trachea midline, no masses, no thyromegaly. No cervical or supraclavicular lymphadenopathy  Respiratory: Unlabored respiratory effort, lungs clear to auscultation, no wheezes, no ronchi.  Cardiovascular: Normal S1, S2, no murmur, no edema.  Abdomen: Soft, non-tender, no masses, no hepatosplenomegaly.  Psych: Alert and oriented x3, normal affect and mood.      Assessment and Plan. The following treatment and monitoring plan is recommended:    1. " Medicare annual wellness visit, subsequent  - Annual Wellness Visit - Includes PPPS Subsequent ()    2. Morbid obesity with BMI of 40.0-44.9, adult (CMS-[facility])  - Patient identified as having weight management issue.  Appropriate orders and counseling given.  - REFERRAL TO [facility] IMPROVEMENT PROGRAMS (HIP) Services Requested: Registered Dietitian Medicare Obesity Counseling; Reason for Visit: Overweight/Obesity  - Annual Wellness Visit - Includes PPPS Subsequent ()    3. Essential hypertension  Controlled. Continue current medications.  - Annual Wellness Visit - Includes PPPS Subsequent ()    4. Elevated fasting blood sugar  Prediabetic on labs. Advised patient to monitor diet and exercise more.  - Annual Wellness Visit - Includes PPPS Subsequent ()    5. Elevated PSA, less than 10 ng/ml  Elevation of 6.3 PSA. No history of previous PSA in our record. Referral to urology.  - REFERRAL TO UROLOGY  - Annual Wellness Visit - Includes PPPS Subsequent ()    6. Presence of artificial left eye  Continue to monitor.  - Annual Wellness Visit - Includes PPPS Subsequent ()    7. RBBB (right bundle branch block)  Asymptomatic. Continue to monitor.  - Annual Wellness Visit - Includes PPPS Subsequent ()    8. Hx of Diverticulosis of colon  Asymptomatic. Continue to monitor.  - Annual Wellness Visit - Includes PPPS Subsequent ()    9. Hx of Elevated liver enzymes  No evidence of recurrence on labs.  - Annual Wellness Visit - Includes PPPS Subsequent ()    10. Erectile dysfunction, unspecified erectile dysfunction type  Prescription for Cialis printed off.  - Annual Wellness Visit - Includes PPPS Subsequent ()  - tadalafil (CIALIS) 5 MG tablet; Take 1 Tab by mouth every day.  Dispense: 90 Tab; Refill: 3    11. Snoring  Not bothering patient. Advised weight loss.  - Annual Wellness Visit - Includes PPPS Subsequent ()        Services suggested: No services needed at this  time  Health Care Screening recommendations as per orders if indicated.  Referrals offered: PT/OT/Nutrition counseling/Behavioral Health/Smoking cessation as per orders if indicated.    Discussion today about general wellness and lifestyle habits:    · Prevent falls and reduce trip hazards; Cautioned about securing or removing rugs.  · Have a working fire alarm and carbon monoxide detector;   · Engage in regular physical activity and social activities       Follow-up: Return in about 1 year (around 1/22/2019) for Annual.

## 2018-01-25 ENCOUNTER — NON-PROVIDER VISIT (OUTPATIENT)
Dept: MEDICAL GROUP | Facility: MEDICAL CENTER | Age: 68
End: 2018-01-25
Payer: MEDICARE

## 2018-01-25 VITALS — BODY MASS INDEX: 40.81 KG/M2 | HEIGHT: 67 IN | WEIGHT: 260 LBS

## 2018-01-25 PROCEDURE — G0447 BEHAVIOR COUNSEL OBESITY 15M: HCPCS | Performed by: INTERNAL MEDICINE

## 2018-01-25 NOTE — PROGRESS NOTES
"IBT INITIAL ASSESMENT    Author: Merary Goldstein Date & Time created: 1/25/2018  11:15 AM   Visit #: 1  Referring Provider: Farhan Guy M.D.  Patient Age: 68 y.o.  Time in/Out:   10:42-11:12am     ASSESS:  Vitals:    01/25/18 1115   Weight: 117.9 kg (260 lb)   Height: 1.702 m (5' 7\")      Vitals:    01/25/18 1115   Weight: 117.9 kg (260 lb)   Height: 1.702 m (5' 7\")    Body mass index is 40.72 kg/m².   Goal Weight:   NA     The patient states health, and preventing diabetes as motivators for weight loss and has not tried any diets for weight loss in the past. Did start to exercise 1 year ago and go a ; lost nearly 60lb but has gained a lot of it back.   Comorbidities include Elevated fasting glucose, HTN, diverticulosis      Current Dietary/Exercise Habits  1.  How many servings of fruits and vegetables do you eat in a typical day?  2 each or less   2. How many servings of whole grains do you eat in a typical day?   Sourdough bread. Half or less are whole grains   3. How many times in a typical week do you eat foods high in fat or eat at a fast food restaurant?  Rarely eats out. Cooks for himself at home.   4. How many times in a typical week do you eat red meat, pork, or processed meat?  2-3   5. How many days a week do you participate in moderately intense physical activity for 30 minutes?  Goes to the gym and walks 1 mile and does 15mins weights Or walks 1mile outside 3 days a week.   6. How many days a week do you participate in vigorously intense physical activity for 20 minutes?  None   7. How many days a week do you do strength training exercise?  None   8. How many meals and snacks do you eat in a typical day? 2 meals, often skips breakfast. Rarely snacks. Fills plate and often goes back for seconds/   9. Nutrition History/other: Since being told he has pre diabetes he has cut back on sugar. Adds half and half to coffee 2T x2-3 a day (~60kcal).  Pt does drink soda occasionally.     Estimated Stage of " Change   Contemplation as evidenced by stating he is not sure why he has gained weight.    ADVISE:    Physical Activity:  Pt is active 3 days a week. Explained benefits of exercise. Pt is not burning significant amount of calories with his current regimen. We will eventually discuss increasing this.      Dietary Guidelines:   Reviewed diet hx today. Encouraged pt to eat 3 meals a day , not to skip breakfast. We discussed healthy breakfast ideas (2-3 eggs, 1 slice toast, 1 tsp butter or 2 T avocado) Or he can make a protein shake: 1/2 cup plain greek yogurt, 1/2 fruit/berries, almond milk, and leafy greens/ veggies. Keeping a food journal will also help with mindful eating. Recommended hand written or to try my fitness pal.     The patient has been advised of how weight management and physical activity impacts their health and will help to reduce complications and health risk factors.    AGREE:  Goals:   1. Keep a 3-5 day food diary of all foods/drinks consumed, the amount you consumed (approximately), and the time it was when consumed. Try using My [email or web address] Pal. Estimated calorie needs= 1800-1900kcal per day   2.  Stop drinking soda or any sweetened beverage  3. Eat 3 meals a day; do not skip breakfast   4. Continue to exercise at least 3 days per week. Walk 1 mile + weights.     ASSIST:  Based on Kaushik's diet hx, it seems as though he is consuming excess calories from large portions, especially at dinner. He states he is not a snacker, but does occasionally have a granola bar. He skips breakfast, or has high carb meal in AM of instant oatmeal. Recommended higher protein meal and less carbs, given elevated fasting blood sugar.   First pt will focus on calories; aiming to stay <1900kcal per day. Next visit we will go over macronutrient targets if he chooses to continue using the [employer] genesis/website.     ARRANGE:     Return for follow-up in 2 weeks    The patient was assisted in making follow-up appointment per  orders.

## 2018-02-08 ENCOUNTER — NON-PROVIDER VISIT (OUTPATIENT)
Dept: MEDICAL GROUP | Facility: MEDICAL CENTER | Age: 68
End: 2018-02-08
Payer: MEDICARE

## 2018-02-08 VITALS — WEIGHT: 257.6 LBS | BODY MASS INDEX: 40.43 KG/M2 | HEIGHT: 67 IN

## 2018-02-08 PROCEDURE — G0447 BEHAVIOR COUNSEL OBESITY 15M: HCPCS | Performed by: INTERNAL MEDICINE

## 2018-02-08 NOTE — PROGRESS NOTES
"2/8/2018     Visit #2      Referring Provider: Farhan Guy M.D. Robert Tedmadison Gann 68 y.o.           Time in/Out:   10:42-11:08am     ASSESS:    Vitals:    02/08/18 1110   Weight: 116.8 kg (257 lb 9.6 oz)   Height: 1.702 m (5' 7\")            Wt Readings from Last 2 Encounters:   02/08/18 116.8 kg (257 lb 9.6 oz)   01/25/18 117.9 kg (260 lb)            Body mass index is 40.35 kg/m².       Weight change since last visit:   -2.4lb      Starting weight:   260lb       Total weight change:  -2.4lb in 2 weeks      Current Dietary/Exercise Habits:   Pt states he has been eating about the same. Is maybe more cautious of what he is eating bc he is witting it all down on paper. Is having breakfast most often but not daily. Is not always hungry in the mornings.   Diet hx: He is having 1/2 sandwich for lunches on sourdugh or tuna on lettuce. Dinners are his main meal -- meat, veggie and starch usually a potato with butter.     Estimated Stage of Change:  Preparation as evidenced by agreeing to goals and recommendations .      ADVISE:    Physical Activity:  Pt has been active daily with either walking or bowling or at the gym x1 hr      Dietary Guidelines:   Today we discussed the Plate Method for meal planning and balance meals with vegetables, carbohydrates, protein and healthy fats. We also discussed portion control and how to use your hand has a tool for estimating portion sizes.  Recommended pt limit carbs to 1/2 cup per meal and 2 fats per meal with 1/2 plate veggies and 4oz protein.     The patient has been advised of how weight management and physical activity impacts their health and will help to reduce complications and health risk factors.        AGREE:  Previous Goals:   1. Keep a 3-5 day food diary of all foods/drinks consumed, the amount you consumed (approximately), and the time it was when consumed. Try using My [employer] Pal. Estimated calorie needs= 1800-1900kcal per day   2.  Stop drinking soda or " any sweetened beverage  3. Eat 3 meals a day; do not skip breakfast   4. Continue to exercise at least 3 days per week. Walk 1 mile + weights.     New Goals:   1. Continue hand written food journal   2. Eat breakfast daily   3. Use the plate method for portion control and macronutrient balance  4oz protein, 2 fats, 1/2 plate ns veggies, and up to 1/2 cup starch per meal       ASSIST:  Kaushik has lost some weight despite not recognizing any specific changes to his diet except for eating breakfast more regularly. Recommended pt focus first on portion control and then next visit we will go over nutrition basics and discuss nutrient quality. As well we will need to discuss healthier snacks; as he is currently eating chips and crackers. Would like him to focus on less processed carbs and incorporate more veggies, fruit and protein.       ARRANGE:     Return for follow-up in 2 weeks      The patient was assisted in making follow-up appointment per orders.

## 2018-02-20 DIAGNOSIS — I10 ESSENTIAL HYPERTENSION: ICD-10-CM

## 2018-02-20 RX ORDER — AMLODIPINE BESYLATE AND BENAZEPRIL HYDROCHLORIDE 5; 20 MG/1; MG/1
2 CAPSULE ORAL DAILY
Qty: 180 CAP | Refills: 3 | Status: SHIPPED | OUTPATIENT
Start: 2018-02-20 | End: 2018-02-21

## 2018-02-20 NOTE — TELEPHONE ENCOUNTER
Was the patient seen in the last year in this department? Yes     Does patient have an active prescription for medications requested? Yes     Received Request Via: Patient     Patient states that [facility] did not receive prescription,

## 2018-02-21 ENCOUNTER — PATIENT MESSAGE (OUTPATIENT)
Dept: MEDICAL GROUP | Facility: MEDICAL CENTER | Age: 68
End: 2018-02-21

## 2018-02-21 DIAGNOSIS — I10 ESSENTIAL HYPERTENSION: ICD-10-CM

## 2018-02-21 RX ORDER — AMLODIPINE BESYLATE 10 MG/1
10 TABLET ORAL DAILY
Qty: 90 TAB | Refills: 3 | Status: SHIPPED | OUTPATIENT
Start: 2018-02-21 | End: 2019-01-28 | Stop reason: SDUPTHER

## 2018-02-21 RX ORDER — BENAZEPRIL HYDROCHLORIDE 40 MG/1
40 TABLET ORAL DAILY
Qty: 90 TAB | Refills: 3 | Status: SHIPPED | OUTPATIENT
Start: 2018-02-21 | End: 2019-01-28 | Stop reason: SDUPTHER

## 2018-02-21 NOTE — TELEPHONE ENCOUNTER
From: Stas Gann  To: Farhan Guy M.D.  Sent: 2/21/2018 8:17 AM PST  Subject: Prescription Question    Hello, I spoke with [facility], they said my prescription for amlodipine/benazepril 5/20mg was not in stock. Could Doctor Malena request a similar medication for me?    Thank you Kaushik Gann

## 2018-02-22 ENCOUNTER — NON-PROVIDER VISIT (OUTPATIENT)
Dept: MEDICAL GROUP | Facility: MEDICAL CENTER | Age: 68
End: 2018-02-22
Payer: MEDICARE

## 2018-02-22 VITALS — HEIGHT: 67 IN | WEIGHT: 254.2 LBS | BODY MASS INDEX: 39.9 KG/M2

## 2018-02-22 PROCEDURE — G0447 BEHAVIOR COUNSEL OBESITY 15M: HCPCS | Performed by: INTERNAL MEDICINE

## 2018-02-22 NOTE — PROGRESS NOTES
"2/22/2018     Visit #3      Referring Provider: Farhan Guy M.D. Robert Tedmadison Gann 68 y.o.           Time in/Out:   10:20-10:40am     ASSESS:    Vitals:    02/22/18 1019   Weight: 115.3 kg (254 lb 3.2 oz)   Height: 1.702 m (5' 7\")            Wt Readings from Last 2 Encounters:   02/22/18 115.3 kg (254 lb 3.2 oz)   02/08/18 116.8 kg (257 lb 9.6 oz)            Body mass index is 39.81 kg/m².       Weight change since last visit:  -3.4lb     Starting weight:  260lb      Total weight change:  -5.8lb     Current Dietary/Exercise Habits:  Pt has been keeping the written food journal. He is eating 2-3 meals. Still working on breakfast. Not always hungry in AM. He states he is eating similar foods but smaller portions.   Diet hx  B- toast or eggs or both   L- sandwich or left overs or salad with protein   D- protein, starch and veggies   S- After dinner sometimes has a fruit or cheese w/ crackers or hummus with crackers     Estimated Stage of Change:  Preparation as evidenced by discussion with patient. He is not yet aware of what his changes have been but is losing weight.      ADVISE:    Physical Activity:  Not discussed today      Dietary Guidelines:  Reviewed food journal. Discussed healthy snack guidelines as well. Discussed meal replacement protein drink recipe: water, spinach, 1 cup fruit/berries or 1 apple, 1 greek yogurt and 1/2 avocado or 1/4 cup nuts.     The patient has been advised of how weight management and physical activity impacts their health and will help to reduce complications and health risk factors.        AGREE:  Previous Goals:   1. Continue hand written food journal   2. Eat breakfast daily   3. Use the plate method for portion control and macronutrient balance  4oz protein, 2 fats, 1/2 plate ns veggies, and up to 1/2 cup starch per meal           New Goals:   1. Eat breakfast daily   2. Consume more vegetables at meals and snacks  3. Cut out processed carbs, especially at " snacks      ASSIST:  Pt is still eating some starch but it seems to be smaller portions than usual. He is losing weight with this plan so no changes were made, except to continue to eat breakfast more consistently, and cut out processed carbs from snacks. Also encouraged pt to increase vegetable intake at both meals and snacks.   Next visit we will review food journal and discuss starch in more detail -- healthier carbs to choose and those to limit given impaired fasting blood sugar.       ARRANGE:     Return for follow-up in 2 weeks      The patient was assisted in making follow-up appointment per orders.

## 2018-03-08 ENCOUNTER — NON-PROVIDER VISIT (OUTPATIENT)
Dept: MEDICAL GROUP | Facility: MEDICAL CENTER | Age: 68
End: 2018-03-08
Payer: MEDICARE

## 2018-03-08 VITALS — WEIGHT: 253.8 LBS | BODY MASS INDEX: 39.83 KG/M2 | HEIGHT: 67 IN

## 2018-03-08 PROCEDURE — G0447 BEHAVIOR COUNSEL OBESITY 15M: HCPCS | Performed by: INTERNAL MEDICINE

## 2018-03-08 NOTE — PROGRESS NOTES
"3/8/2018     Visit # 4      Referring Provider: Farhan Guy M.D. Robert Ted Azeem 68 y.o.           Time in/Out:   10:20-10:45am     ASSESS:    Vitals:    03/08/18 1046   Weight: 115.1 kg (253 lb 12.8 oz)   Height: 1.702 m (5' 7\")            Wt Readings from Last 2 Encounters:   03/08/18 115.1 kg (253 lb 12.8 oz)   02/22/18 115.3 kg (254 lb 3.2 oz)            Body mass index is 39.75 kg/m².       Weight change since last visit:    -0.4lb    Starting weight:  260lb      Total weight change:  -6.2lb     Current Dietary/Exercise Habits:  Pt is keeping a written food journal. Eats 2 meals a day. Said he is not hungry for breakfast so skips it and just has coffee. Eats meat, veggie and starch for dinner (white minute rice 1-2 cups Or small red potato). Has 1 large slice of sourdough bread (22g carbs) at lunch and 1 slice with eggs at breakfast if having breakfast    Estimated Stage of Change:  Preparation as evidenced by agrees to goals set and guidelines discussed today.      ADVISE:    Physical Activity:   Not discussed today      Dietary Guidelines:   Reviewed insulin resistance. Discussed complex carbs vs simple sugars. Recommended pt switch to more complex carbs (that are less refined grains and lower GI foods).     The patient has been advised of how weight management and physical activity impacts their health and will help to reduce complications and health risk factors.        AGREE:  Previous Goals:   1. Eat breakfast daily   2. Consume more vegetables at meals and snacks  3. Cut out processed carbs, especially at snacks    New Goals:   1. Eat breakfast daily (2 eggs, 1 toast, 1-2 tsp butter or oil)   2. Reduce carb/starch to 1/2 to 1 cup at dinner   3. Switch to whole grains and lower glycemic index carbs (brown rice > white rice, yams/sweet potato > white potato, and whole grain bread > sourdough)   4. Limit carbs of carbs to 30 (max 45g) per meal       ASSIST:  AFter reviewing food journal and " discussing with pt, he is consuming excess carbs at dinner and would benefit from not only smaller serving, but also healthier lower glycemic carbs at his meals. He is willing to transition to whole grain bread, brown rice, and sweet potato and will also watch his portion. The other benefit would come from eating breakfast consistently. Discussed benefits of eating breakfast with pt.       ARRANGE:     Return for follow-up in 2 weeks      The patient was assisted in making follow-up appointment per orders.

## 2018-03-22 ENCOUNTER — NON-PROVIDER VISIT (OUTPATIENT)
Dept: MEDICAL GROUP | Facility: MEDICAL CENTER | Age: 68
End: 2018-03-22
Payer: MEDICARE

## 2018-03-22 VITALS — BODY MASS INDEX: 39.8 KG/M2 | HEIGHT: 67 IN | WEIGHT: 253.6 LBS

## 2018-03-22 PROCEDURE — G0447 BEHAVIOR COUNSEL OBESITY 15M: HCPCS | Performed by: INTERNAL MEDICINE

## 2018-03-22 NOTE — PROGRESS NOTES
"3/22/2018     Visit # 5      Referring Provider: Farhan Guy M.D. Robert Ted Azeem 68 y.o.           Time in/Out:   10:20-10:38am     ASSESS:    Vitals:    03/22/18 1037   Weight: 115 kg (253 lb 9.6 oz)   Height: 1.702 m (5' 7\")            Wt Readings from Last 2 Encounters:   03/22/18 115 kg (253 lb 9.6 oz)   03/08/18 115.1 kg (253 lb 12.8 oz)            Body mass index is 39.72 kg/m².       Weight change since last visit:   -0.2lb      Starting weight:  260lb      Total weight change:  -6.4lb     Current Dietary/Exercise Habits:  Pt has kept a hand written food journal today. He is eating breakfast most days, but still finds it difficult. He is not hungry in the AM. Is most hungry at night. Ate out a few times in the past couple weeks (burgers with fries, or chicken sandwiches with fries). Snacks at night at usually just fruit. Noticed he eats a lot of cheese. As well he does consume ham, sausage, and beef often.     Estimated Stage of Change:  Preparation as evidenced by agreeing to goals discussed.      ADVISE:    Physical Activity:   Not discussed today      Dietary Guidelines:  Reviewed food journal. Recommended pt take caution with french fries and eating out in general. He will try eating 1/2 portion of his meals when dinning out. Also recommended leaner proteins more often, and to choose reduced fat( but not fat free) dairy given that he eats a lot of cheese, and cottage cheese.     The patient has been advised of how weight management and physical activity impacts their health and will help to reduce complications and health risk factors.        AGREE:  Previous Goals:   1. Eat breakfast daily (2 eggs, 1 toast, 1-2 tsp butter or oil)   2. Reduce carb/starch to 1/2 to 1 cup at dinner   3. Switch to whole grains and lower glycemic index carbs (brown rice > white rice, yams/sweet potato > white potato, and whole grain bread > sourdough)   4. Limit carbs of carbs to 30 (max 45g) per meal     New " Goals:   1. Eat smaller portions when eating out and avoid fried foods if possible   2. Choose reduced fat dairy   3. Choose leaner proteins/meats more often   4. Eat breakfast daily       ASSIST:  Pt did a good job of switching to more whole grains and lower GI carbs. He did eat excess calories still however from high calorie foods ie French fries when eating out. He agrees to watch that, and he would also benefit from reducing his saturated fat consumption.   Additionally it will take time for pt to adjust his appetite to be more hungry in AM and less at night. If he eats more in the AM he should be more satisfied at night and by not snacking late he should theoretically be more hungry in the AM to avoid skipping breakfast.       ARRANGE:     Return for follow-up in 2 weeks      The patient was assisted in making follow-up appointment per orders.

## 2018-04-05 ENCOUNTER — NON-PROVIDER VISIT (OUTPATIENT)
Dept: MEDICAL GROUP | Facility: MEDICAL CENTER | Age: 68
End: 2018-04-05
Payer: MEDICARE

## 2018-04-05 VITALS — HEIGHT: 67 IN | WEIGHT: 254.4 LBS | BODY MASS INDEX: 39.93 KG/M2

## 2018-04-05 PROCEDURE — G0447 BEHAVIOR COUNSEL OBESITY 15M: HCPCS | Performed by: INTERNAL MEDICINE

## 2018-04-05 NOTE — PROGRESS NOTES
"4/5/2018     Visit # 6      Referring Provider: Farhan Guy M.D. Robert Tedmadison Gann 68 y.o.           Time in/Out:   10:20-10:38am     ASSESS:    Vitals:    04/05/18 1038   Weight: 115.4 kg (254 lb 6.4 oz)   Height: 1.702 m (5' 7\")            Wt Readings from Last 2 Encounters:   04/05/18 115.4 kg (254 lb 6.4 oz)   03/22/18 115 kg (253 lb 9.6 oz)            Body mass index is 39.84 kg/m².       Weight change since last visit:   + 0.8lb    Starting weight:  260lb      Total weight change:  -5.6lb     Current Dietary/Exercise Habits:  Pt states he has been less active for the past couple weeks bc he has been very busy. He enjoys going to the gym but doesn't feel it is realistic to go ever day. He also enjoys walking and feels he can do that more consistently. Pt feels he could still improve on his portions; eats large portions for lunch and dinner. Often skips breakfast still. Pt has tracked calories in the past and found it helpful.     Estimated Stage of Change:  Preparation as evidenced by planning to use the food journal website to track calories.      ADVISE:    Physical Activity:  Recommended 5 days of exercise a week; walking 1-1.5miles or the gym.      Dietary Guidelines:  Recommended pt use food journal website My Fitness Pal to track his calorie intake. Estimated kcal intake is 1800-2000kcal per day. Also discussed importance of small portion of carbs given elevated A1c, and impaired fasting glucose.     The patient has been advised of how weight management and physical activity impacts their health and will help to reduce complications and health risk factors.        AGREE:  Previous Goals:   1. Eat smaller portions when eating out and avoid fried foods if possible   2. Choose reduced fat dairy   3. Choose leaner proteins/meats more often   4. Eat breakfast daily     New Goals:   1. Use my fitness pal to track calories and measure portions  2. Eat breakfast daily   3. Walk 1-1.5miles, or go to " gym 5 days per week       ASSIST:  Pt has been doing the hand written journal but it is still not enough to help him learn about proper portions. I feel that measuring his portions for at least 2-4 weeks will help him to learn about portion control. He has done calorie counting in the past and it was helpful, so will have him try that again. He will bring in his log in information so we can review his food journal next appt for further suggestions/ adjustments.       ARRANGE:     Return for follow-up in 2 weeks      The patient was assisted in making follow-up appointment per orders.

## 2018-07-05 ENCOUNTER — PATIENT MESSAGE (OUTPATIENT)
Dept: MEDICAL GROUP | Facility: MEDICAL CENTER | Age: 68
End: 2018-07-05

## 2018-07-05 DIAGNOSIS — C61 PROSTATE CANCER (HCC): ICD-10-CM

## 2018-07-05 NOTE — TELEPHONE ENCOUNTER
From: Stas Gann  To: Farhan Guy M.D.  Sent: 7/5/2018 10:36 AM PDT  Subject: Non-Urgent Medical Question    Hello Dr Guy: Dr Rich Kulkarni of [facility] did a biopsy on my prostrate and found cancer in one area of the prostrate. He has recommended radiation treatments and I will meet with Dr Raul Santiago of [facility] next week. Could you please authorize my visit or referral?    Thanks, Stas Gann

## 2018-10-10 ENCOUNTER — PATIENT MESSAGE (OUTPATIENT)
Dept: MEDICAL GROUP | Facility: MEDICAL CENTER | Age: 68
End: 2018-10-10

## 2018-10-10 RX ORDER — TAMSULOSIN HYDROCHLORIDE 0.4 MG/1
0.4 CAPSULE ORAL
Qty: 90 CAP | Refills: 1 | Status: SHIPPED | OUTPATIENT
Start: 2018-10-10 | End: 2019-03-21 | Stop reason: SDUPTHER

## 2018-10-10 RX ORDER — TAMSULOSIN HYDROCHLORIDE 0.4 MG/1
0.4 CAPSULE ORAL
Qty: 30 CAP | Refills: 5 | Status: SHIPPED | OUTPATIENT
Start: 2018-10-10 | End: 2018-10-10 | Stop reason: SDUPTHER

## 2018-10-10 NOTE — TELEPHONE ENCOUNTER
From: Stas Gann  To: Farhan Guy M.D.  Sent: 10/10/2018 9:46 AM PDT  Subject: Non-Urgent Medical Question    Hello Dr. Guy, I have completed my treatments with Dr. Raul Santiago of [facility].  I'm due back with him in six weeks. He said it will take 4 to 6 weeks for me to return to normal. I'm taking Tamsulosin HCL 0.4 MG capsules once a day. Could you place an order for me for 30 pills?    Thanks, Kaushik Gann

## 2019-01-28 DIAGNOSIS — I10 ESSENTIAL HYPERTENSION: ICD-10-CM

## 2019-01-29 RX ORDER — BENAZEPRIL HYDROCHLORIDE 40 MG/1
TABLET ORAL
Qty: 90 TAB | Refills: 3 | Status: SHIPPED | OUTPATIENT
Start: 2019-01-29 | End: 2020-01-15 | Stop reason: SDUPTHER

## 2019-01-29 RX ORDER — AMLODIPINE BESYLATE 10 MG/1
TABLET ORAL
Qty: 90 TAB | Refills: 3 | Status: SHIPPED | OUTPATIENT
Start: 2019-01-29 | End: 2020-01-15 | Stop reason: SDUPTHER

## 2019-01-29 NOTE — TELEPHONE ENCOUNTER
Letter sent.    
Refill done. Patient is due for annual appointment. Please have patient schedule.  Farhan Guy M.D.      
[place]

## 2019-02-06 ENCOUNTER — PATIENT MESSAGE (OUTPATIENT)
Dept: MEDICAL GROUP | Facility: MEDICAL CENTER | Age: 69
End: 2019-02-06

## 2019-02-06 ENCOUNTER — HOSPITAL ENCOUNTER (OUTPATIENT)
Dept: LAB | Facility: MEDICAL CENTER | Age: 69
End: 2019-02-06
Attending: RADIOLOGY
Payer: MEDICARE

## 2019-02-06 DIAGNOSIS — R73.01 ELEVATED FASTING BLOOD SUGAR: ICD-10-CM

## 2019-02-06 DIAGNOSIS — I10 ESSENTIAL HYPERTENSION: ICD-10-CM

## 2019-02-06 LAB — PSA SERPL-MCNC: 4.71 NG/ML (ref 0–4)

## 2019-02-06 PROCEDURE — 84153 ASSAY OF PSA TOTAL: CPT

## 2019-02-06 PROCEDURE — 36415 COLL VENOUS BLD VENIPUNCTURE: CPT

## 2019-02-06 NOTE — TELEPHONE ENCOUNTER
From: Stas Gann  To: Farhan Guy M.D.  Sent: 2/6/2019 11:44 AM PST  Subject: Non-Urgent Medical Question    Hello Dr. Guy, I'm meeting Dr. Santiago on Feb 20th. He requested blood work for my prostrate, which I have done. I have finished my radiation treatments, and feel good.   Do you want blood work done before I see you on the 21th? Let me know.    Thanks, Stas Gann

## 2019-02-13 ENCOUNTER — HOSPITAL ENCOUNTER (OUTPATIENT)
Dept: LAB | Facility: MEDICAL CENTER | Age: 69
End: 2019-02-13
Attending: FAMILY MEDICINE
Payer: MEDICARE

## 2019-02-13 DIAGNOSIS — I10 ESSENTIAL HYPERTENSION: ICD-10-CM

## 2019-02-13 DIAGNOSIS — R73.01 ELEVATED FASTING BLOOD SUGAR: ICD-10-CM

## 2019-02-13 LAB
ALBUMIN SERPL BCP-MCNC: 4.2 G/DL (ref 3.2–4.9)
ALBUMIN/GLOB SERPL: 1.5 G/DL
ALP SERPL-CCNC: 63 U/L (ref 30–99)
ALT SERPL-CCNC: 19 U/L (ref 2–50)
ANION GAP SERPL CALC-SCNC: 7 MMOL/L (ref 0–11.9)
AST SERPL-CCNC: 22 U/L (ref 12–45)
BASOPHILS # BLD AUTO: 0.7 % (ref 0–1.8)
BASOPHILS # BLD: 0.04 K/UL (ref 0–0.12)
BILIRUB SERPL-MCNC: 0.6 MG/DL (ref 0.1–1.5)
BUN SERPL-MCNC: 19 MG/DL (ref 8–22)
CALCIUM SERPL-MCNC: 9.4 MG/DL (ref 8.5–10.5)
CHLORIDE SERPL-SCNC: 106 MMOL/L (ref 96–112)
CHOLEST SERPL-MCNC: 161 MG/DL (ref 100–199)
CO2 SERPL-SCNC: 26 MMOL/L (ref 20–33)
CREAT SERPL-MCNC: 0.98 MG/DL (ref 0.5–1.4)
EOSINOPHIL # BLD AUTO: 0.19 K/UL (ref 0–0.51)
EOSINOPHIL NFR BLD: 3.2 % (ref 0–6.9)
ERYTHROCYTE [DISTWIDTH] IN BLOOD BY AUTOMATED COUNT: 44.8 FL (ref 35.9–50)
EST. AVERAGE GLUCOSE BLD GHB EST-MCNC: 114 MG/DL
FASTING STATUS PATIENT QL REPORTED: NORMAL
GLOBULIN SER CALC-MCNC: 2.8 G/DL (ref 1.9–3.5)
GLUCOSE SERPL-MCNC: 100 MG/DL (ref 65–99)
HBA1C MFR BLD: 5.6 % (ref 0–5.6)
HCT VFR BLD AUTO: 48.4 % (ref 42–52)
HDLC SERPL-MCNC: 39 MG/DL
HGB BLD-MCNC: 15.8 G/DL (ref 14–18)
IMM GRANULOCYTES # BLD AUTO: 0.03 K/UL (ref 0–0.11)
IMM GRANULOCYTES NFR BLD AUTO: 0.5 % (ref 0–0.9)
LDLC SERPL CALC-MCNC: 84 MG/DL
LYMPHOCYTES # BLD AUTO: 1.59 K/UL (ref 1–4.8)
LYMPHOCYTES NFR BLD: 26.7 % (ref 22–41)
MCH RBC QN AUTO: 27.2 PG (ref 27–33)
MCHC RBC AUTO-ENTMCNC: 32.6 G/DL (ref 33.7–35.3)
MCV RBC AUTO: 83.4 FL (ref 81.4–97.8)
MONOCYTES # BLD AUTO: 0.67 K/UL (ref 0–0.85)
MONOCYTES NFR BLD AUTO: 11.2 % (ref 0–13.4)
NEUTROPHILS # BLD AUTO: 3.44 K/UL (ref 1.82–7.42)
NEUTROPHILS NFR BLD: 57.7 % (ref 44–72)
NRBC # BLD AUTO: 0 K/UL
NRBC BLD-RTO: 0 /100 WBC
PLATELET # BLD AUTO: 224 K/UL (ref 164–446)
PMV BLD AUTO: 10.3 FL (ref 9–12.9)
POTASSIUM SERPL-SCNC: 4.1 MMOL/L (ref 3.6–5.5)
PROT SERPL-MCNC: 7 G/DL (ref 6–8.2)
RBC # BLD AUTO: 5.8 M/UL (ref 4.7–6.1)
SODIUM SERPL-SCNC: 139 MMOL/L (ref 135–145)
TRIGL SERPL-MCNC: 191 MG/DL (ref 0–149)
WBC # BLD AUTO: 6 K/UL (ref 4.8–10.8)

## 2019-02-13 PROCEDURE — 85025 COMPLETE CBC W/AUTO DIFF WBC: CPT

## 2019-02-13 PROCEDURE — 83036 HEMOGLOBIN GLYCOSYLATED A1C: CPT | Mod: GA

## 2019-02-13 PROCEDURE — 36415 COLL VENOUS BLD VENIPUNCTURE: CPT

## 2019-02-13 PROCEDURE — 80053 COMPREHEN METABOLIC PANEL: CPT

## 2019-02-13 PROCEDURE — 80061 LIPID PANEL: CPT

## 2019-02-14 ENCOUNTER — TELEPHONE (OUTPATIENT)
Dept: MEDICAL GROUP | Facility: MEDICAL CENTER | Age: 69
End: 2019-02-14

## 2019-02-14 NOTE — TELEPHONE ENCOUNTER
Left message for patient to call back regarding pre-visit planning. Please transfer call to [phone].

## 2019-02-14 NOTE — TELEPHONE ENCOUNTER
Future Appointments       Provider Department Center    2/21/2019 3:20 PM Farhan Guy M.D.; [facility]          ANNUAL WELLNESS VISIT PRE-VISIT PLANNING WITHOUT OUTREACH    1.  Reviewed note from last office visit with PCP: YES    2.  If any orders were placed at last visit, do we have Results/Consult Notes?        •  Labs - Labs were not ordered at last office visit.  Note: If patient appointment is for lab review and patient did not complete labs, check with provider if OK to reschedule patient until labs completed.       •  Imaging - Imaging was not ordered at last office visit.       •  Referrals - Referral ordered, patient has NOT been seen.    3.  Immunizations were updated in Epic using WebIZ?: Epic matches WebIZ       •  WebIZ Recommendations: TD        •  Is patient due for Tdap? NO       •  Is patient due for Shingrix? NO     4.  Patient is due for the following Health Maintenance Topics:   Health Maintenance Due   Topic Date Due   • Annual Wellness Visit  01/23/2019       - Patient is up-to-date on all Health Maintenance topics. No records have been requested at this time.    5.  Reviewed/Updated the following with patient:       •   Preferred Pharmacy? YES       •   Preferred Lab? YES       •   Preferred Communication? YES       •   Allergies? YES       •   Medications? YES. Was Abstract Encounter opened and chart updated? YES       •   Social History? YES. Was Abstract Encounter opened and chart updated? YES       •   Family History (document living status of immediate family members and if + hx of  cancer, diabetes, hypertension, hyperlipidemia, heart attack, stroke) YES. Was Abstract Encounter opened and chart updated? YES    6.  Care Team Updated:       •   DME Company (gait device, O2, CPAP, etc.): NO       •   Other Specialists (eye doctor, derm, GYN, cardiology, endo, etc): YES    7. Orders for overdue Health Maintenance topics pended  in Pre-Charting? NO    8.  Patient has the following Care Path diagnoses on Problem List:  NONE    9.  Patient was advised: “This is a free wellness visit. The provider will screen for medical conditions to help you stay healthy. If you have other concerns to address you may be asked to discuss these at a separate visit or there may be an additional fee.”     10.  Patient was informed to arrive 15 min prior to their scheduled appointment and bring in their medication bottles.

## 2019-02-19 RX ORDER — DIPHENOXYLATE HYDROCHLORIDE AND ATROPINE SULFATE 2.5; .025 MG/1; MG/1
TABLET ORAL
COMMUNITY
End: 2023-04-19

## 2019-02-21 ENCOUNTER — OFFICE VISIT (OUTPATIENT)
Dept: MEDICAL GROUP | Facility: MEDICAL CENTER | Age: 69
End: 2019-02-21
Payer: MEDICARE

## 2019-02-21 VITALS
WEIGHT: 258.5 LBS | BODY MASS INDEX: 40.57 KG/M2 | SYSTOLIC BLOOD PRESSURE: 142 MMHG | HEART RATE: 71 BPM | HEIGHT: 67 IN | DIASTOLIC BLOOD PRESSURE: 76 MMHG | OXYGEN SATURATION: 95 % | TEMPERATURE: 97.6 F

## 2019-02-21 DIAGNOSIS — N40.1 BENIGN PROSTATIC HYPERPLASIA WITH NOCTURIA: ICD-10-CM

## 2019-02-21 DIAGNOSIS — E66.01 MORBID OBESITY WITH BMI OF 40.0-44.9, ADULT (HCC): ICD-10-CM

## 2019-02-21 DIAGNOSIS — N52.9 ERECTILE DYSFUNCTION, UNSPECIFIED ERECTILE DYSFUNCTION TYPE: ICD-10-CM

## 2019-02-21 DIAGNOSIS — I45.10 RBBB (RIGHT BUNDLE BRANCH BLOCK): ICD-10-CM

## 2019-02-21 DIAGNOSIS — I10 ESSENTIAL HYPERTENSION: ICD-10-CM

## 2019-02-21 DIAGNOSIS — C61 PROSTATE CANCER (HCC): ICD-10-CM

## 2019-02-21 DIAGNOSIS — R73.01 ELEVATED FASTING BLOOD SUGAR: ICD-10-CM

## 2019-02-21 DIAGNOSIS — R35.1 BENIGN PROSTATIC HYPERPLASIA WITH NOCTURIA: ICD-10-CM

## 2019-02-21 DIAGNOSIS — Z00.00 MEDICARE ANNUAL WELLNESS VISIT, SUBSEQUENT: ICD-10-CM

## 2019-02-21 PROCEDURE — G0439 PPPS, SUBSEQ VISIT: HCPCS | Performed by: FAMILY MEDICINE

## 2019-02-21 RX ORDER — TADALAFIL 5 MG/1
5 TABLET ORAL DAILY
Qty: 90 TAB | Refills: 3 | Status: SHIPPED | OUTPATIENT
Start: 2019-02-21 | End: 2020-02-21 | Stop reason: SDUPTHER

## 2019-02-21 ASSESSMENT — PAIN SCALES - GENERAL: PAINLEVEL: NO PAIN

## 2019-02-21 ASSESSMENT — ENCOUNTER SYMPTOMS: GENERAL WELL-BEING: GOOD

## 2019-02-21 ASSESSMENT — PATIENT HEALTH QUESTIONNAIRE - PHQ9: CLINICAL INTERPRETATION OF PHQ2 SCORE: 0

## 2019-02-21 ASSESSMENT — ACTIVITIES OF DAILY LIVING (ADL): BATHING_REQUIRES_ASSISTANCE: 0

## 2019-02-21 NOTE — PROGRESS NOTES
Chief Complaint   Patient presents with   • Annual Wellness Visit         HPI:  Stas is a 69 y.o. here for Medicare Annual Wellness Visit        Patient Active Problem List    Diagnosis Date Noted   • Benign prostatic hyperplasia with nocturia 02/21/2019   • Morbid obesity with BMI of 40.0-44.9, adult (HCC) 01/22/2018   • Prostate cancer (HCC) 01/22/2018   • Hx of Diverticulosis of colon 01/08/2015   • ED (erectile dysfunction) 12/18/2014   • Hx of Elevated liver enzymes 05/06/2014   • Elevated fasting blood sugar 05/06/2014   • HTN (hypertension) 04/29/2014   • Snoring 04/29/2014   • RBBB (right bundle branch block) 04/29/2014   • Presence of artificial left eye 04/29/2014       Current Outpatient Prescriptions   Medication Sig Dispense Refill   • tadalafil (CIALIS) 5 MG tablet Take 1 Tab by mouth every day. 90 Tab 3   • Multiple Vitamin (MULTI-VITAMINS) Tab Take  by mouth.     • benazepril (LOTENSIN) 40 MG tablet TAKE 1 TABLET DAILY 90 Tab 3   • amLODIPine (NORVASC) 10 MG Tab TAKE 1 TABLET DAILY 90 Tab 3   • tamsulosin (FLOMAX) 0.4 MG capsule Take 1 Cap by mouth ONE-HALF HOUR AFTER BREAKFAST. 90 Cap 1   • ascorbic acid (ASCORBIC ACID) 500 MG Tab Take 500 mg by mouth every day.       No current facility-administered medications for this visit.         Patient is taking medications as noted in medication list.  Current supplements as per medication list.     Allergies: Nkda [no known drug allergy]    Current social contact/activities: Pt belongs to do singles groups. Pt goes out to dinners and wine tastings. Pt goes bowling.     Is patient current with immunizations? Yes.    He  reports that he quit smoking about 38 years ago. His smoking use included Cigarettes. He has a 10.00 pack-year smoking history. He has never used smokeless tobacco. He reports that he does not drink alcohol or use drugs.  Counseling given: Not Answered        DPA/Advanced directive: Patient has Advanced Directive, but it is not on file.  Instructed to bring in a copy to scan into their chart.    ROS:    Gait: Uses no assistive device   Ostomy: No   Other tubes: No   Amputations: No   Chronic oxygen use No   Last eye exam August 1, 2018   Wears hearing aids: No   : Denies any urinary leakage during the last 6 months      Depression Screening    Little interest or pleasure in doing things?  0 - not at all  Feeling down, depressed, or hopeless? 0 - not at all  Patient Health Questionnaire Score: 0    If depressive symptoms identified deferred to follow up visit unless specifically addressed in assessment and plan.    Interpretation of PHQ-9 Total Score   Score Severity   1-4 No Depression   5-9 Mild Depression   10-14 Moderate Depression   15-19 Moderately Severe Depression   20-27 Severe Depression    Screening for Cognitive Impairment    Three Minute Recall (leader, season, table)  2/3    Etienne clock face with all 12 numbers and set the hands to show 10 past 11.  Yes 5/5  If cognitive concerns identified, deferred for follow up unless specifically addressed in assessment and plan.    Fall Risk Assessment    Has the patient had two or more falls in the last year or any fall with injury in the last year?  No  If fall risk identified, deferred for follow up unless specifically addressed in assessment and plan.    Safety Assessment    Throw rugs on floor.  Yes  Handrails on all stairs.  No  Good lighting in all hallways.  Yes  Difficulty hearing.  No  Patient counseled about all safety risks that were identified.    Functional Assessment ADLs    Are there any barriers preventing you from cooking for yourself or meeting nutritional needs?  No.    Are there any barriers preventing you from driving safely or obtaining transportation?  No.    Are there any barriers preventing you from using a telephone or calling for help?  No.    Are there any barriers preventing you from shopping?  No.    Are there any barriers preventing you from taking care of your own  "finances?  No.    Are there any barriers preventing you from managing your medications?  No.    Are there any barriers preventing you from showering, bathing or dressing yourself?  No.    Are you currently engaging in any exercise or physical activity?  Yes.  Pt goes to the [employer] gym 3 x weekly at hour intervals. Pt goes bowling for exercise.  What is your perception of your health?  Good.    Health Maintenance Summary                Annual Wellness Visit Overdue 1/23/2019      Done 1/22/2018 Visit Dx: Medicare annual wellness visit, subsequent     Patient has more history with this topic...    COLONOSCOPY Next Due 8/13/2024      Done 8/13/2014 AMB REFERRAL TO GI FOR COLONOSCOPY    IMM DTaP/Tdap/Td Vaccine Next Due 9/3/2025      Done 9/3/2015 Imm Admin: Tdap Vaccine          Patient Care Team:  Farhan Guy M.D. as PCP - General (Family Medicine)  Raul Santiago M.D. as Consulting Physician (Radiation Oncology)  Mauri Kulkarni M.D. as Consulting Physician (Pain Management)    Social History   Substance Use Topics   • Smoking status: Former Smoker     Packs/day: 1.00     Years: 10.00     Types: Cigarettes     Quit date: 9/2/1980   • Smokeless tobacco: Never Used      Comment: Quit 1980   • Alcohol use No     History reviewed. No pertinent family history.  He  has a past medical history of Hypertension.   Past Surgical History:   Procedure Laterality Date   • MASS EXCISION GENERAL  5/19/2009    Performed by CARSON BRAND at SURGERY SAME DAY [facility] ORS           Exam:     Blood pressure 142/76, pulse 71, temperature 36.4 °C (97.6 °F), temperature source Temporal, height 1.702 m (5' 7\"), weight 117.3 kg (258 lb 8 oz), SpO2 95 %. Body mass index is 40.49 kg/m².    Constitutional: Alert, no distress.  Skin: Warm, dry, good turgor, no rashes in visible areas.  Eye: Equal, round and reactive, conjunctiva clear, lids normal.  Respiratory: Unlabored respiratory effort, lungs clear to auscultation, no wheezes, no " shirley.  Cardiovascular: Normal S1, S2, no murmur, no edema.  Psych: Alert and oriented x3, normal affect and mood.      Assessment and Plan. The following treatment and monitoring plan is recommended:    1. Medicare annual wellness visit, subsequent  Advised healthy lifestyle.  - Subsequent Annual Wellness Visit - Includes PPPS ()    2. Essential hypertension  Controlled. Continue amlodipine 10 mg daily and benazepril 40 mg daily. Follow up annually.  - Subsequent Annual Wellness Visit - Includes PPPS ()    3. Elevated fasting blood sugar  A1c normal. Advised healthy lifestyle.  - Subsequent Annual Wellness Visit - Includes PPPS ()    4. Prostate cancer ([facility])  Advised patient to follow up with rad onc and urology.  - Subsequent Annual Wellness Visit - Includes PPPS ()    5. Morbid obesity with BMI of 40.0-44.9, adult ([facility])  Advised healthy lifestyle.  - Subsequent Annual Wellness Visit - Includes PPPS ()    6. Erectile dysfunction, unspecified erectile dysfunction type  Prescription for Cialis 5 mg daily.  - tadalafil (CIALIS) 5 MG tablet; Take 1 Tab by mouth every day.  Dispense: 90 Tab; Refill: 3  - Subsequent Annual Wellness Visit - Includes PPPS ()    7. Benign prostatic hyperplasia with nocturia  Prescription for Cialis 5 mg daily.  - tadalafil (CIALIS) 5 MG tablet; Take 1 Tab by mouth every day.  Dispense: 90 Tab; Refill: 3  - Subsequent Annual Wellness Visit - Includes PPPS ()    8. RBBB (right bundle branch block)  Stable. Continue to monitor.  - Subsequent Annual Wellness Visit - Includes PPPS ()        Services suggested: No services needed at this time  Health Care Screening recommendations as per orders if indicated.  Referrals offered: PT/OT/Nutrition counseling/Behavioral Health/Smoking cessation as per orders if indicated.    Discussion today about general wellness and lifestyle habits:    · Prevent falls and reduce trip hazards; Cautioned about securing or  removing rugs.  · Have a working fire alarm and carbon monoxide detector;   · Engage in regular physical activity and social activities       Follow-up: Return in about 1 year (around 2/21/2020) for Annual.

## 2019-03-21 RX ORDER — TAMSULOSIN HYDROCHLORIDE 0.4 MG/1
CAPSULE ORAL
Qty: 90 CAP | Refills: 3 | Status: SHIPPED | OUTPATIENT
Start: 2019-03-21 | End: 2020-01-15 | Stop reason: SDUPTHER

## 2019-04-09 ENCOUNTER — HOSPITAL ENCOUNTER (OUTPATIENT)
Dept: LAB | Facility: MEDICAL CENTER | Age: 69
End: 2019-04-09
Attending: UROLOGY
Payer: MEDICARE

## 2019-04-09 LAB — PSA SERPL-MCNC: 4.45 NG/ML (ref 0–4)

## 2019-04-09 PROCEDURE — 36415 COLL VENOUS BLD VENIPUNCTURE: CPT | Mod: GA

## 2019-04-09 PROCEDURE — 84153 ASSAY OF PSA TOTAL: CPT | Mod: GA

## 2019-08-08 ENCOUNTER — HOSPITAL ENCOUNTER (EMERGENCY)
Facility: MEDICAL CENTER | Age: 69
End: 2019-08-08
Attending: EMERGENCY MEDICINE
Payer: MEDICARE

## 2019-08-08 VITALS
BODY MASS INDEX: 38.76 KG/M2 | HEIGHT: 68 IN | OXYGEN SATURATION: 95 % | WEIGHT: 255.73 LBS | HEART RATE: 75 BPM | TEMPERATURE: 97.5 F | DIASTOLIC BLOOD PRESSURE: 76 MMHG | RESPIRATION RATE: 16 BRPM | SYSTOLIC BLOOD PRESSURE: 126 MMHG

## 2019-08-08 DIAGNOSIS — S51.812A LACERATION OF LEFT FOREARM, INITIAL ENCOUNTER: ICD-10-CM

## 2019-08-08 PROCEDURE — 304217 HCHG IRRIGATION SYSTEM

## 2019-08-08 PROCEDURE — 99283 EMERGENCY DEPT VISIT LOW MDM: CPT

## 2019-08-08 PROCEDURE — 304999 HCHG REPAIR-SIMPLE/INTERMED LEVEL 1

## 2019-08-08 PROCEDURE — 700101 HCHG RX REV CODE 250: Performed by: EMERGENCY MEDICINE

## 2019-08-08 PROCEDURE — 303747 HCHG EXTRA SUTURE

## 2019-08-08 RX ORDER — LIDOCAINE HYDROCHLORIDE AND EPINEPHRINE 10; 10 MG/ML; UG/ML
20 INJECTION, SOLUTION INFILTRATION; PERINEURAL ONCE
Status: COMPLETED | OUTPATIENT
Start: 2019-08-08 | End: 2019-08-08

## 2019-08-08 RX ADMIN — LIDOCAINE HYDROCHLORIDE AND EPINEPHRINE 20 ML: 10; 10 INJECTION, SOLUTION INFILTRATION; PERINEURAL at 13:30

## 2019-08-08 NOTE — DISCHARGE INSTRUCTIONS
Please follow-up with your primary care physician, urgent care or this department in 7 to 10 days for suture removal.

## 2019-08-08 NOTE — ED PROVIDER NOTES
ED Provider Note    CHIEF COMPLAINT  Chief Complaint   Patient presents with   • Laceration     Around 1100am today was using a saw and cut forearm. about 4cm in lenght, not approximated, some bleeding       HPI  Stas Gann is a 69 y.o. male who presentsComplaint of laceration to his left forearm.  The patient is using a saw which slipped and cut his left forearm.  Bleeding was controlled, his tetanus is not current, he is a right-hand-dominant male.  Denies loss of sensation or strength to his left upper extremity.  REVIEW OF SYSTEMS  Pertinent positives include laceration to left forearm  Pertinent negatives include loss of sensation or strength to left upper extremity,    PAST MEDICAL HISTORY  Past Medical History:   Diagnosis Date   • Hypertension        FAMILY HISTORY  No family history on file.    SOCIAL HISTORY  Social History     Socioeconomic History   • Marital status: Single     Spouse name: Not on file   • Number of children: Not on file   • Years of education: Not on file   • Highest education level: Not on file   Occupational History   • Not on file   Social Needs   • Financial resource strain: Not on file   • Food insecurity:     Worry: Not on file     Inability: Not on file   • Transportation needs:     Medical: Not on file     Non-medical: Not on file   Tobacco Use   • Smoking status: Former Smoker     Packs/day: 1.00     Years: 10.00     Pack years: 10.00     Types: Cigarettes     Last attempt to quit: 1980     Years since quittin.9   • Smokeless tobacco: Never Used   • Tobacco comment: Quit    Substance and Sexual Activity   • Alcohol use: No   • Drug use: No   • Sexual activity: Yes     Partners: Female   Lifestyle   • Physical activity:     Days per week: Not on file     Minutes per session: Not on file   • Stress: Not on file   Relationships   • Social connections:     Talks on phone: Not on file     Gets together: Not on file     Attends [employer] service: Not on file     " Active member of club or organization: Not on file     Attends meetings of clubs or organizations: Not on file     Relationship status: Not on file   • Intimate partner violence:     Fear of current or ex partner: Not on file     Emotionally abused: Not on file     Physically abused: Not on file     Forced sexual activity: Not on file   Other Topics Concern   • Not on file   Social History Narrative   • Not on file       SURGICAL HISTORY  Past Surgical History:   Procedure Laterality Date   • MASS EXCISION GENERAL  5/19/2009    Performed by CARSON BRAND at SURGERY SAME DAY [facility] ORS       CURRENT MEDICATIONS  Home Medications    **Home medications have not yet been reviewed for this encounter**         ALLERGIES  Allergies   Allergen Reactions   • Nkda [No Known Drug Allergy]        PHYSICAL EXAM  VITAL SIGNS: /76   Pulse 75   Temp 36.4 °C (97.5 °F) (Temporal)   Resp 16   Ht 1.727 m (5' 8\")   Wt 116 kg (255 lb 11.7 oz)   SpO2 95%   BMI 38.88 kg/m²      Constitutional: Well developed, Well nourished, No acute distress, Non-toxic appearance.   Eyes: PERRLA, EOMI, Conjunctiva normal, No discharge.   Cardiovascular: Normal heart rate, Normal rhythm, No murmurs, No rubs, No gallops.   Thorax & Lungs:  No respiratory distress, no rales, no rhonchi, No wheezing, No chest tenderness.   Abdomen: Bowel sounds normal, Soft, No tenderness, No guarding, No rebound, No masses, No pulsatile masses.   Skin: 4 similar laceration on the dorsum of the left forearm, superficial, there is no muscle involvement, no tendon or nerve involvement.  No active bleeding  Extremities: Laceration described as above, radial and ulnar pulses are brisk distally, no tendon, arterial, nervous or venous abnormality  Neurologic: Ulnar, median radial nerve left upper extremity      RADIOLOGY/PROCEDURES  .Laceration Repair Procedure Note    Indication: Laceration    Procedure: The patient was placed in the appropriate position and " anesthesia around the laceration was obtained by infiltration using 4.0 cc of 1% Lidocaine with epinephrine. The area was then irrigated with normal saline. The laceration was closed with 3-0 Ethilon using interrupted sutures. There were no additional lacerations requiring repair. The wound area was then dressed with a sterile dressing.      Total repaired wound length: 4 cm.     Other Items: Suture count: 7    The patient tolerated the procedure well.    Complications: None          COURSE & MEDICAL DECISION MAKING  Pertinent Labs & Imaging studies reviewed. (See chart for details)  This is a pleasant 69-year-old Edilberto presents laceration to the left.  Patient has no neurological vascular deficits.  Patient has no tendon involvement as well.  The medical student and I did approximate the laceration with sutures as above.  I did observe the procedure.  The patient has a current tetanus and does not require antibiotics.  He is instructed return to the emergency department, urgent care or primary care physician within 7 to 10 days for suture removal.  Strict return precautions have been given infection.    Discharge Medication List as of 8/8/2019  2:07 PM            FINAL IMPRESSION     1. Laceration of left forearm, initial encounter        DISPOSITION:  Patient will be discharged home in stable condition.    Electronically signed by: Srikanth Forbes, 8/8/2019 1:14 PM

## 2019-08-08 NOTE — ED TRIAGE NOTES
"Chief Complaint   Patient presents with   • Laceration     Around 1100am today was using a saw and cut forearm. about 4cm in lenght, not approximated, some bleeding     /93   Pulse 83   Temp 36.6 °C (97.9 °F) (Temporal)   Resp 18   Ht 1.727 m (5' 8\")   Wt 116 kg (255 lb 11.7 oz)   SpO2 96%   BMI 38.88 kg/m²     "

## 2019-08-08 NOTE — ED NOTES
Discharge information reviewed in detail. Patient verbalized understanding of discharge instructions to follow up with PCP and to return to ER if condition worsens.  Patient verbalized understanding of returning in 7-10 days for suture removal.  Self to drive home.   Patient ambulated out of ER in a steady gait.

## 2019-08-15 ENCOUNTER — OFFICE VISIT (OUTPATIENT)
Dept: MEDICAL GROUP | Facility: MEDICAL CENTER | Age: 69
End: 2019-08-15
Payer: MEDICARE

## 2019-08-15 VITALS
TEMPERATURE: 97.7 F | HEART RATE: 71 BPM | WEIGHT: 256 LBS | BODY MASS INDEX: 40.18 KG/M2 | SYSTOLIC BLOOD PRESSURE: 140 MMHG | HEIGHT: 67 IN | OXYGEN SATURATION: 96 % | DIASTOLIC BLOOD PRESSURE: 72 MMHG

## 2019-08-15 DIAGNOSIS — S41.112A ARM LACERATION, LEFT, INITIAL ENCOUNTER: ICD-10-CM

## 2019-08-15 PROCEDURE — 99214 OFFICE O/P EST MOD 30 MIN: CPT | Performed by: NURSE PRACTITIONER

## 2019-08-15 NOTE — PROGRESS NOTES
Subjective:     Stas Gann is a 69 y.o. male who presents with laceration left wrist.    HPI:   Seen in f/u for laceration to left wrist.  He went to ER after receiving cut from saw that he was using.  Sutures in place.  No pain.  No fever, chills or sweating.  No dg but + red.     Patient Active Problem List    Diagnosis Date Noted   • Benign prostatic hyperplasia with nocturia 02/21/2019   • Morbid obesity with BMI of 40.0-44.9, adult (HCC) 01/22/2018   • Prostate cancer (HCC) 01/22/2018   • Hx of Diverticulosis of colon 01/08/2015   • ED (erectile dysfunction) 12/18/2014   • Hx of Elevated liver enzymes 05/06/2014   • Elevated fasting blood sugar 05/06/2014   • HTN (hypertension) 04/29/2014   • Snoring 04/29/2014   • RBBB (right bundle branch block) 04/29/2014   • Presence of artificial left eye 04/29/2014       Current medicines (including changes today)  Current Outpatient Medications   Medication Sig Dispense Refill   • tamsulosin (FLOMAX) 0.4 MG capsule TAKE 1 CAPSULE 1/2 HOUR AFTER BREAKFAST 90 Cap 3   • tadalafil (CIALIS) 5 MG tablet Take 1 Tab by mouth every day. 90 Tab 3   • Multiple Vitamin (MULTI-VITAMINS) Tab Take  by mouth.     • benazepril (LOTENSIN) 40 MG tablet TAKE 1 TABLET DAILY 90 Tab 3   • amLODIPine (NORVASC) 10 MG Tab TAKE 1 TABLET DAILY 90 Tab 3   • ascorbic acid (ASCORBIC ACID) 500 MG Tab Take 500 mg by mouth every day.       No current facility-administered medications for this visit.        Allergies   Allergen Reactions   • Nkda [No Known Drug Allergy]        ROS  Constitutional: Negative. Negative for fever, chills, weight loss, malaise/fatigue and diaphoresis.   HENT: Negative. Negative for hearing loss, ear pain, nosebleeds, congestion, sore throat, neck pain, tinnitus and ear discharge.   Respiratory: Negative. Negative for cough, hemoptysis, sputum production, shortness of breath, wheezing and stridor.   Cardiovascular: Negative. Negative for chest pain, palpitations,  "orthopnea, claudication, leg swelling and PND.   Gastrointestinal: Denies nausea, vomiting, diarrhea, constipation, heartburn, melena or hematochezia.  Genitourinary: Denies dysuria, hematuria, urinary incontinence, frequency or urgency.        Objective:     /72 (BP Location: Right arm, Patient Position: Sitting)   Pulse 71   Temp 36.5 °C (97.7 °F) (Temporal)   Ht 1.702 m (5' 7\")   Wt 116.1 kg (256 lb)   SpO2 96%  Body mass index is 40.1 kg/m².    Physical Exam:  Vitals reviewed.  Constitutional: Oriented to person, place, and time. appears well-developed and well-nourished. No distress.   Cardiovascular: Normal rate, regular rhythm, normal heart sounds and intact distal pulses. Exam reveals no gallop and no friction rub. No murmur heard. No carotid bruits.   Pulmonary/Chest: Effort normal and breath sounds normal. No stridor. No respiratory distress. no wheezes or rales. exhibits no tenderness.   Musculoskeletal: Normal range of motion. exhibits no edema rt arm. luana radial pulses 2+.  Neurological: Alert and oriented to person, place, and time. exhibits normal muscle tone.  Skin: Skin is warm and dry. No diaphoresis.  Left wrist laceration cleansed with alcohol. Sutures removed.  Small amt erythema at site w/o dg or swelling.  steristrip and polysporin applied.   Psychiatric: Normal mood and affect. Behavior is normal.      Assessment and Plan:     The following treatment plan was discussed:    1. Arm laceration, left, initial encounter      no sx of infection.  sutures removed.  pt will monitor for sx of infection:  reddness,swelling, warmth, dg, fever.  dsg reapplied.  will chg daily for 3-4 days    2. BMI 40.0-44.9, adult (HCC)  Patient identified as having weight management issue.  Appropriate orders and counseling given.     3.  F/u for any sx of infection.    4.  Remove dsg after 3-4 days.      Followup: Return if symptoms worsen or fail to improve.  "

## 2019-11-14 ENCOUNTER — HOSPITAL ENCOUNTER (OUTPATIENT)
Dept: LAB | Facility: MEDICAL CENTER | Age: 69
End: 2019-11-14
Attending: FAMILY MEDICINE
Payer: MEDICARE

## 2019-11-14 PROCEDURE — 84153 ASSAY OF PSA TOTAL: CPT

## 2019-11-14 PROCEDURE — 36415 COLL VENOUS BLD VENIPUNCTURE: CPT

## 2019-11-15 LAB — PSA SERPL-MCNC: 3.07 NG/ML (ref 0–4)

## 2020-01-15 DIAGNOSIS — I10 ESSENTIAL HYPERTENSION: ICD-10-CM

## 2020-01-15 RX ORDER — TAMSULOSIN HYDROCHLORIDE 0.4 MG/1
0.4 CAPSULE ORAL
Qty: 90 CAP | Refills: 3 | Status: SHIPPED | OUTPATIENT
Start: 2020-01-15 | End: 2022-03-23

## 2020-01-15 RX ORDER — BENAZEPRIL HYDROCHLORIDE 40 MG/1
40 TABLET ORAL DAILY
Qty: 90 TAB | Refills: 3 | Status: SHIPPED | OUTPATIENT
Start: 2020-01-15 | End: 2021-02-15 | Stop reason: SDUPTHER

## 2020-01-15 RX ORDER — AMLODIPINE BESYLATE 10 MG/1
10 TABLET ORAL DAILY
Qty: 90 TAB | Refills: 3 | Status: SHIPPED | OUTPATIENT
Start: 2020-01-15 | End: 2021-02-15 | Stop reason: SDUPTHER

## 2020-01-30 ENCOUNTER — PATIENT MESSAGE (OUTPATIENT)
Dept: MEDICAL GROUP | Facility: MEDICAL CENTER | Age: 70
End: 2020-01-30

## 2020-01-30 DIAGNOSIS — R73.01 ELEVATED FASTING BLOOD SUGAR: ICD-10-CM

## 2020-01-30 DIAGNOSIS — I10 ESSENTIAL HYPERTENSION: ICD-10-CM

## 2020-01-30 NOTE — TELEPHONE ENCOUNTER
From: Stas Gann  To: Farhan Guy M.D.  Sent: 1/30/2020 9:41 AM PST  Subject: Non-Urgent Medical Question    Hello, I'm due for my annual checkup on 2/21/2020. Could you forward any blood work that needs to be done to the [facility]?    Thanks, Kaushik Gann

## 2020-02-12 ENCOUNTER — HOSPITAL ENCOUNTER (OUTPATIENT)
Dept: LAB | Facility: MEDICAL CENTER | Age: 70
End: 2020-02-12
Attending: FAMILY MEDICINE
Payer: MEDICARE

## 2020-02-12 DIAGNOSIS — I10 ESSENTIAL HYPERTENSION: ICD-10-CM

## 2020-02-12 DIAGNOSIS — R73.01 ELEVATED FASTING BLOOD SUGAR: ICD-10-CM

## 2020-02-12 LAB
ALBUMIN SERPL BCP-MCNC: 4.6 G/DL (ref 3.2–4.9)
ALBUMIN/GLOB SERPL: 1.9 G/DL
ALP SERPL-CCNC: 53 U/L (ref 30–99)
ALT SERPL-CCNC: 26 U/L (ref 2–50)
ANION GAP SERPL CALC-SCNC: 7 MMOL/L (ref 0–11.9)
AST SERPL-CCNC: 24 U/L (ref 12–45)
BASOPHILS # BLD AUTO: 0.9 % (ref 0–1.8)
BASOPHILS # BLD: 0.04 K/UL (ref 0–0.12)
BILIRUB SERPL-MCNC: 0.7 MG/DL (ref 0.1–1.5)
BUN SERPL-MCNC: 20 MG/DL (ref 8–22)
CALCIUM SERPL-MCNC: 9.8 MG/DL (ref 8.5–10.5)
CHLORIDE SERPL-SCNC: 105 MMOL/L (ref 96–112)
CHOLEST SERPL-MCNC: 179 MG/DL (ref 100–199)
CO2 SERPL-SCNC: 27 MMOL/L (ref 20–33)
CREAT SERPL-MCNC: 1.17 MG/DL (ref 0.5–1.4)
EOSINOPHIL # BLD AUTO: 0.13 K/UL (ref 0–0.51)
EOSINOPHIL NFR BLD: 2.9 % (ref 0–6.9)
ERYTHROCYTE [DISTWIDTH] IN BLOOD BY AUTOMATED COUNT: 42.7 FL (ref 35.9–50)
EST. AVERAGE GLUCOSE BLD GHB EST-MCNC: 126 MG/DL
GLOBULIN SER CALC-MCNC: 2.4 G/DL (ref 1.9–3.5)
GLUCOSE SERPL-MCNC: 129 MG/DL (ref 65–99)
HBA1C MFR BLD: 6 % (ref 0–5.6)
HCT VFR BLD AUTO: 47.9 % (ref 42–52)
HDLC SERPL-MCNC: 40 MG/DL
HGB BLD-MCNC: 15.6 G/DL (ref 14–18)
IMM GRANULOCYTES # BLD AUTO: 0.02 K/UL (ref 0–0.11)
IMM GRANULOCYTES NFR BLD AUTO: 0.4 % (ref 0–0.9)
LDLC SERPL CALC-MCNC: 114 MG/DL
LYMPHOCYTES # BLD AUTO: 1.68 K/UL (ref 1–4.8)
LYMPHOCYTES NFR BLD: 37.2 % (ref 22–41)
MCH RBC QN AUTO: 28.4 PG (ref 27–33)
MCHC RBC AUTO-ENTMCNC: 32.6 G/DL (ref 33.7–35.3)
MCV RBC AUTO: 87.2 FL (ref 81.4–97.8)
MONOCYTES # BLD AUTO: 0.48 K/UL (ref 0–0.85)
MONOCYTES NFR BLD AUTO: 10.6 % (ref 0–13.4)
NEUTROPHILS # BLD AUTO: 2.17 K/UL (ref 1.82–7.42)
NEUTROPHILS NFR BLD: 48 % (ref 44–72)
NRBC # BLD AUTO: 0 K/UL
NRBC BLD-RTO: 0 /100 WBC
PLATELET # BLD AUTO: 215 K/UL (ref 164–446)
PMV BLD AUTO: 10.2 FL (ref 9–12.9)
POTASSIUM SERPL-SCNC: 4.1 MMOL/L (ref 3.6–5.5)
PROT SERPL-MCNC: 7 G/DL (ref 6–8.2)
RBC # BLD AUTO: 5.49 M/UL (ref 4.7–6.1)
SODIUM SERPL-SCNC: 139 MMOL/L (ref 135–145)
TRIGL SERPL-MCNC: 127 MG/DL (ref 0–149)
WBC # BLD AUTO: 4.5 K/UL (ref 4.8–10.8)

## 2020-02-12 PROCEDURE — 85025 COMPLETE CBC W/AUTO DIFF WBC: CPT

## 2020-02-12 PROCEDURE — 84443 ASSAY THYROID STIM HORMONE: CPT

## 2020-02-12 PROCEDURE — 80053 COMPREHEN METABOLIC PANEL: CPT

## 2020-02-12 PROCEDURE — 36415 COLL VENOUS BLD VENIPUNCTURE: CPT

## 2020-02-12 PROCEDURE — 83036 HEMOGLOBIN GLYCOSYLATED A1C: CPT | Mod: GA

## 2020-02-12 PROCEDURE — 80061 LIPID PANEL: CPT

## 2020-02-13 LAB — TSH SERPL DL<=0.005 MIU/L-ACNC: 3.09 UIU/ML (ref 0.38–5.33)

## 2020-02-21 ENCOUNTER — OFFICE VISIT (OUTPATIENT)
Dept: MEDICAL GROUP | Facility: MEDICAL CENTER | Age: 70
End: 2020-02-21
Payer: MEDICARE

## 2020-02-21 VITALS
RESPIRATION RATE: 14 BRPM | WEIGHT: 269 LBS | SYSTOLIC BLOOD PRESSURE: 126 MMHG | OXYGEN SATURATION: 97 % | BODY MASS INDEX: 39.84 KG/M2 | HEART RATE: 86 BPM | DIASTOLIC BLOOD PRESSURE: 78 MMHG | HEIGHT: 69 IN | TEMPERATURE: 97.9 F

## 2020-02-21 DIAGNOSIS — R35.1 BENIGN PROSTATIC HYPERPLASIA WITH NOCTURIA: ICD-10-CM

## 2020-02-21 DIAGNOSIS — R73.01 ELEVATED FASTING BLOOD SUGAR: ICD-10-CM

## 2020-02-21 DIAGNOSIS — E66.01 MORBID OBESITY WITH BMI OF 40.0-44.9, ADULT (HCC): ICD-10-CM

## 2020-02-21 DIAGNOSIS — N52.9 ERECTILE DYSFUNCTION, UNSPECIFIED ERECTILE DYSFUNCTION TYPE: ICD-10-CM

## 2020-02-21 DIAGNOSIS — C61 PROSTATE CANCER (HCC): ICD-10-CM

## 2020-02-21 DIAGNOSIS — N40.1 BENIGN PROSTATIC HYPERPLASIA WITH NOCTURIA: ICD-10-CM

## 2020-02-21 DIAGNOSIS — Z00.00 MEDICARE ANNUAL WELLNESS VISIT, SUBSEQUENT: ICD-10-CM

## 2020-02-21 DIAGNOSIS — I10 ESSENTIAL HYPERTENSION: ICD-10-CM

## 2020-02-21 PROCEDURE — G0439 PPPS, SUBSEQ VISIT: HCPCS | Performed by: FAMILY MEDICINE

## 2020-02-21 RX ORDER — TADALAFIL 5 MG/1
5 TABLET ORAL DAILY
Qty: 90 TAB | Refills: 3 | Status: SHIPPED | OUTPATIENT
Start: 2020-02-21 | End: 2021-02-09 | Stop reason: SDUPTHER

## 2020-02-21 ASSESSMENT — ACTIVITIES OF DAILY LIVING (ADL): BATHING_REQUIRES_ASSISTANCE: 0

## 2020-02-21 ASSESSMENT — PATIENT HEALTH QUESTIONNAIRE - PHQ9: CLINICAL INTERPRETATION OF PHQ2 SCORE: 0

## 2020-02-21 ASSESSMENT — ENCOUNTER SYMPTOMS: GENERAL WELL-BEING: GOOD

## 2020-02-21 NOTE — PROGRESS NOTES
Chief Complaint   Patient presents with   • Annual Exam         HPI:  Stas Gann is a 70 y.o. here for Medicare Annual Wellness Visit     Patient Active Problem List    Diagnosis Date Noted   • Benign prostatic hyperplasia with nocturia 02/21/2019   • Morbid obesity with BMI of 40.0-44.9, adult (HCC) 01/22/2018   • Prostate cancer (HCC) 01/22/2018   • Hx of Diverticulosis of colon 01/08/2015   • ED (erectile dysfunction) 12/18/2014   • Hx of Elevated liver enzymes 05/06/2014   • Elevated fasting blood sugar 05/06/2014   • HTN (hypertension) 04/29/2014   • Snoring 04/29/2014   • RBBB (right bundle branch block) 04/29/2014   • Presence of artificial left eye 04/29/2014       Current Outpatient Medications   Medication Sig Dispense Refill   • tadalafil (CIALIS) 5 MG tablet Take 1 Tab by mouth every day. 90 Tab 3   • benazepril (LOTENSIN) 40 MG tablet Take 1 Tab by mouth every day. 90 Tab 3   • amLODIPine (NORVASC) 10 MG Tab Take 1 Tab by mouth every day. 90 Tab 3   • tamsulosin (FLOMAX) 0.4 MG capsule Take 1 Cap by mouth ONE-HALF HOUR AFTER BREAKFAST. 90 Cap 3   • Multiple Vitamin (MULTI-VITAMINS) Tab Take  by mouth.     • ascorbic acid (ASCORBIC ACID) 500 MG Tab Take 500 mg by mouth every day.       No current facility-administered medications for this visit.             Current supplements as per medication list.       Allergies: Nkda [no known drug allergy]    Current social contact/activities: living alone. Bowl 2 days per week. Goes to gym.     He  reports that he quit smoking about 39 years ago. His smoking use included cigarettes. He has a 10.00 pack-year smoking history. He has never used smokeless tobacco. He reports that he does not drink alcohol or use drugs.  Counseling given: No  Comment: Quit 1980      DPA/Advanced Directive:  Patient has Advanced Directive, but it is not on file. Instructed to bring in a copy to scan into their chart.    ROS:    Gait: Uses no assistive device  Ostomy: No  Other  tubes: No  Amputations: No  Chronic oxygen use: No  Last eye exam: within the last 1 year.  Wears hearing aids: No   : Denies any urinary leakage during the last 6 months      Depression Screening    Little interest or pleasure in doing things?  0 - not at all  Feeling down, depressed , or hopeless? 0 - not at all  Patient Health Questionnaire Score: 0     If depressive symptoms identified deferred to follow up visit unless specifically addressed in assessment and plan.    Interpretation of PHQ-9 Total Score   Score Severity   1-4 No Depression   5-9 Mild Depression   10-14 Moderate Depression   15-19 Moderately Severe Depression   20-27 Severe Depression    Screening for Cognitive Impairment    Three Minute Recall (village, kitchen, baby) 3/3    Etienne clock face with all 12 numbers and set the hands to show 10 past 10.  Yes    Cognitive concerns identified deferred for follow up unless specifically addressed in assessment and plan.    Fall Risk Assessment    Has the patient had two or more falls in the last year or any fall with injury in the last year?  No    Safety Assessment    Throw rugs on floor.  Yes  Handrails on all stairs.  No  Good lighting in all hallways.  Yes  Difficulty hearing.  No  Patient counseled about all safety risks that were identified.    Functional Assessment ADLs    Are there any barriers preventing you from cooking for yourself or meeting nutritional needs?  No.    Are there any barriers preventing you from driving safely or obtaining transportation?  No.    Are there any barriers preventing you from using a telephone or calling for help?  No.    Are there any barriers preventing you from shopping?  No.    Are there any barriers preventing you from taking care of your own finances?  No.    Are there any barriers preventing you from managing your medications?  No.    Are there any barriers preventing you from showering, bathing or dressing yourself?  No.    Are you currently engaging in  "any exercise or physical activity?  Yes.     What is your perception of your health?  Good.      Health Maintenance Summary                Annual Wellness Visit Next Due 2020      Done 2019 SUBSEQUENT ANNUAL WELLNESS VISIT-INCLUDES PPPS ()     Patient has more history with this topic...    COLONOSCOPY Next Due 2024      Done 2014 AMB REFERRAL TO GI FOR COLONOSCOPY    IMM DTaP/Tdap/Td Vaccine Next Due 9/3/2025      Done 9/3/2015 Imm Admin: Tdap Vaccine          Patient Care Team:  Farhan Guy M.D. as PCP - General (Family Medicine)  Raul Santiago M.D. as Consulting Physician (Radiation Oncology)  Mauri Kulkarni M.D. as Consulting Physician (Pain Management)        Social History     Tobacco Use   • Smoking status: Former Smoker     Packs/day: 1.00     Years: 10.00     Pack years: 10.00     Types: Cigarettes     Last attempt to quit: 1980     Years since quittin.4   • Smokeless tobacco: Never Used   • Tobacco comment: Quit    Substance Use Topics   • Alcohol use: No   • Drug use: No     History reviewed. No pertinent family history.  He  has a past medical history of Hypertension.   Past Surgical History:   Procedure Laterality Date   • MASS EXCISION GENERAL  2009    Performed by CARSON BRAND at SURGERY SAME DAY [facility] ORS       Exam:   /78 (BP Location: Right arm, Patient Position: Sitting, BP Cuff Size: Large adult)   Pulse 86   Temp 36.6 °C (97.9 °F) (Temporal)   Resp 14   Ht 1.74 m (5' 8.5\")   Wt 122 kg (269 lb)   SpO2 97%  Body mass index is 40.31 kg/m².    Constitutional: Alert, no distress.  Skin: Warm, dry, good turgor, no rashes in visible areas.  Eye: Equal, round and reactive, conjunctiva clear, lids normal.  ENMT: Lips without lesions, good dentition, oropharynx clear. TMs pearly gray bilaterally.  Neck: Trachea midline, no masses, no thyromegaly. No cervical or supraclavicular lymphadenopathy  Respiratory: Unlabored respiratory effort, lungs " clear to auscultation, no wheezes, no ronchi.  Cardiovascular: Normal S1, S2, no murmur, no edema.  Psych: Alert and oriented x3, normal affect and mood.      Assessment and Plan. The following treatment and monitoring plan is recommended:    1. Medicare annual wellness visit, subsequent  Encouraged healthy lifestyle.    2. Prostate cancer ([facility])  PSA is down from 6 to now 3 after radiation.  He is being followed by urology and radiation oncology.    3. Morbid obesity with BMI of 40.0-44.9, adult ([facility])  Encourage patient to continue with regular exercise and decrease portion sizes.    4. Essential hypertension  Controlled.  Continue amlodipine and benazepril.    5. Benign prostatic hyperplasia with nocturia  Stable.  Continue Flomax and Cialis.  - tadalafil (CIALIS) 5 MG tablet; Take 1 Tab by mouth every day.  Dispense: 90 Tab; Refill: 3    6. Erectile dysfunction, unspecified erectile dysfunction type  Stable.  Continue Cialis.  - tadalafil (CIALIS) 5 MG tablet; Take 1 Tab by mouth every day.  Dispense: 90 Tab; Refill: 3    7. Elevated fasting blood sugar  A1c is up from 5.5 down to 6.0.  Advised reducing carbohydrate, portion sizes and continuing exercise.          Services suggested: No services needed at this time  Health Care Screening: Age-appropriate preventive services recommended by USPTF and ACIP covered by Medicare were discussed today. Services ordered if indicated and agreed upon by the patient.  Referrals offered: Community-based lifestyle interventions to reduce health risks and promote self-management and wellness, fall prevention, nutrition, physical activity, tobacco-use cessation, weight loss, and mental health services as per orders if indicated.    Discussion today about general wellness and lifestyle habits:    · Prevent falls and reduce trip hazards; Cautioned about securing or removing rugs.  · Have a working fire alarm and carbon monoxide detector;   · Engage in regular physical activity and  social activities     Follow-up: Return in about 1 year (around 2/21/2021) for Annual.

## 2020-02-21 NOTE — NON-PROVIDER
Annual Health Assessment Questions:    1.  Are you currently engaging in any exercise or physical activity? Yes    2.  How would you describe your mood or emotional well-being today? good    3.  Have you had any falls in the last year? No    4.  Have you noticed any problems with your balance or had difficulty walking? No    5.  In the last six months have you experienced any leakage of urine? No    6. DPA/Advanced Directive: Patient has Advanced Directive on file.

## 2020-05-27 NOTE — TELEPHONE ENCOUNTER
From: Stas Gann  To: Farhan Guy M.D.  Sent: 10/10/2018 12:48 PM PDT  Subject: Non-Urgent Medical Question    Hello Dr. Guy, I receive my meds from [facility], not [facility].    Thanks, Kaushik Gann  
suture/staple removal

## 2020-09-24 ENCOUNTER — HOSPITAL ENCOUNTER (OUTPATIENT)
Dept: LAB | Facility: MEDICAL CENTER | Age: 70
End: 2020-09-24
Attending: RADIOLOGY
Payer: MEDICARE

## 2020-09-24 LAB — PSA SERPL-MCNC: 2.33 NG/ML (ref 0–4)

## 2020-09-24 PROCEDURE — 84153 ASSAY OF PSA TOTAL: CPT

## 2020-09-24 PROCEDURE — 36415 COLL VENOUS BLD VENIPUNCTURE: CPT

## 2020-09-30 ENCOUNTER — TELEPHONE (OUTPATIENT)
Dept: MEDICAL GROUP | Facility: MEDICAL CENTER | Age: 70
End: 2020-09-30

## 2020-09-30 DIAGNOSIS — Z11.59 ENCOUNTER FOR SCREENING FOR OTHER VIRAL DISEASES: ICD-10-CM

## 2020-09-30 NOTE — TELEPHONE ENCOUNTER
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 He currently presents with no symptoms but is requesting order for nasal swab test. Please advise.

## 2020-09-30 NOTE — TELEPHONE ENCOUNTER
Nasal swab ordered  Farhan Guy M.D.     Patient picked up: prescription.   Identity was verified: Yes.

## 2020-10-01 ENCOUNTER — HOSPITAL ENCOUNTER (OUTPATIENT)
Dept: LAB | Facility: MEDICAL CENTER | Age: 70
End: 2020-10-01
Attending: FAMILY MEDICINE
Payer: MEDICARE

## 2020-10-01 DIAGNOSIS — Z11.59 ENCOUNTER FOR SCREENING FOR OTHER VIRAL DISEASES: ICD-10-CM

## 2020-10-01 LAB — COVID ORDER STATUS COVID19: NORMAL

## 2020-10-01 PROCEDURE — C9803 HOPD COVID-19 SPEC COLLECT: HCPCS

## 2020-10-01 PROCEDURE — U0003 INFECTIOUS AGENT DETECTION BY NUCLEIC ACID (DNA OR RNA); SEVERE ACUTE RESPIRATORY SYNDROME CORONAVIRUS 2 (SARS-COV-2) (CORONAVIRUS DISEASE [COVID-19]), AMPLIFIED PROBE TECHNIQUE, MAKING USE OF HIGH THROUGHPUT TECHNOLOGIES AS DESCRIBED BY CMS-2020-01-R: HCPCS

## 2020-10-02 LAB
SARS-COV-2 RNA RESP QL NAA+PROBE: NOTDETECTED
SPECIMEN SOURCE: NORMAL

## 2020-12-22 ENCOUNTER — APPOINTMENT (OUTPATIENT)
Dept: MEDICAL GROUP | Facility: MEDICAL CENTER | Age: 70
End: 2020-12-22
Payer: MEDICARE

## 2021-01-15 DIAGNOSIS — Z23 NEED FOR VACCINATION: ICD-10-CM

## 2021-02-09 ENCOUNTER — OFFICE VISIT (OUTPATIENT)
Dept: MEDICAL GROUP | Facility: MEDICAL CENTER | Age: 71
End: 2021-02-09
Payer: MEDICARE

## 2021-02-09 VITALS
OXYGEN SATURATION: 96 % | RESPIRATION RATE: 16 BRPM | DIASTOLIC BLOOD PRESSURE: 90 MMHG | TEMPERATURE: 98.1 F | SYSTOLIC BLOOD PRESSURE: 140 MMHG | HEIGHT: 68 IN | BODY MASS INDEX: 40.92 KG/M2 | WEIGHT: 270 LBS | HEART RATE: 78 BPM

## 2021-02-09 DIAGNOSIS — R35.1 BENIGN PROSTATIC HYPERPLASIA WITH NOCTURIA: ICD-10-CM

## 2021-02-09 DIAGNOSIS — I45.10 RIGHT BUNDLE BRANCH BLOCK: ICD-10-CM

## 2021-02-09 DIAGNOSIS — R06.83 SNORING: ICD-10-CM

## 2021-02-09 DIAGNOSIS — R74.8 ELEVATED LIVER ENZYMES: ICD-10-CM

## 2021-02-09 DIAGNOSIS — C61 MALIGNANT NEOPLASM OF PROSTATE (HCC): ICD-10-CM

## 2021-02-09 DIAGNOSIS — N52.9 ERECTILE DYSFUNCTION, UNSPECIFIED ERECTILE DYSFUNCTION TYPE: ICD-10-CM

## 2021-02-09 DIAGNOSIS — Z86.010 HISTORY OF COLON POLYPS: ICD-10-CM

## 2021-02-09 DIAGNOSIS — K57.30 DIVERTICULOSIS OF COLON: ICD-10-CM

## 2021-02-09 DIAGNOSIS — E66.01 MORBID OBESITY WITH BMI OF 40.0-44.9, ADULT (HCC): ICD-10-CM

## 2021-02-09 DIAGNOSIS — I10 ESSENTIAL HYPERTENSION: ICD-10-CM

## 2021-02-09 DIAGNOSIS — E78.00 ELEVATED LDL CHOLESTEROL LEVEL: ICD-10-CM

## 2021-02-09 DIAGNOSIS — R73.03 PREDIABETES: ICD-10-CM

## 2021-02-09 DIAGNOSIS — N40.1 BENIGN PROSTATIC HYPERPLASIA WITH NOCTURIA: ICD-10-CM

## 2021-02-09 PROBLEM — Z86.0100 HISTORY OF COLON POLYPS: Status: ACTIVE | Noted: 2021-02-09

## 2021-02-09 PROCEDURE — 99214 OFFICE O/P EST MOD 30 MIN: CPT | Performed by: FAMILY MEDICINE

## 2021-02-09 RX ORDER — TADALAFIL 5 MG/1
5 TABLET ORAL DAILY
Qty: 90 TAB | Refills: 3 | Status: SHIPPED
Start: 2021-02-09 | End: 2022-04-19 | Stop reason: SDUPTHER

## 2021-02-09 RX ORDER — ATORVASTATIN CALCIUM 10 MG/1
10 TABLET, FILM COATED ORAL DAILY
Qty: 90 TAB | Refills: 3 | Status: SHIPPED | OUTPATIENT
Start: 2021-02-09 | End: 2021-12-14

## 2021-02-09 SDOH — HEALTH STABILITY: MENTAL HEALTH: HOW OFTEN DO YOU HAVE A DRINK CONTAINING ALCOHOL?: MONTHLY OR LESS

## 2021-02-09 ASSESSMENT — ENCOUNTER SYMPTOMS
CONSTIPATION: 0
PALPITATIONS: 0
COUGH: 0
WEIGHT LOSS: 0
HEMOPTYSIS: 0
FOCAL WEAKNESS: 0
VOMITING: 0
SPUTUM PRODUCTION: 0
HEADACHES: 0
EYE REDNESS: 0
FEVER: 0
CHILLS: 0
DIARRHEA: 0
WHEEZING: 0
NERVOUS/ANXIOUS: 0
SINUS PAIN: 0
MYALGIAS: 0
ABDOMINAL PAIN: 0
SHORTNESS OF BREATH: 0
EYE DISCHARGE: 0
DEPRESSION: 0
NAUSEA: 0
EYE PAIN: 0
DIZZINESS: 0
SENSORY CHANGE: 0

## 2021-02-09 ASSESSMENT — FIBROSIS 4 INDEX: FIB4 SCORE: 1.6

## 2021-02-09 ASSESSMENT — PATIENT HEALTH QUESTIONNAIRE - PHQ9: CLINICAL INTERPRETATION OF PHQ2 SCORE: 0

## 2021-02-09 NOTE — ASSESSMENT & PLAN NOTE
This is a chronic health problem for this patient.reports that previously was evaluated for sleep apnea.  Tried using CPAP machine 5 to 6 years ago but was not able to tolerate and does not want to use CPAP machine now.

## 2021-02-09 NOTE — ASSESSMENT & PLAN NOTE
This is a chronic health problem for this patient.  His blood pressure today is borderline elevated at 140/90.  Currently taking amlodipine 10 mg and Benzapril 40 mg daily.  Reports that he is compliant with his medications.  Denies any side effects with medication.

## 2021-02-09 NOTE — ASSESSMENT & PLAN NOTE
Reviewed last colonoscopy in media section which was done in 2014 which showed 3 polyps, biopsy results not available.  Patient reports that he does not remember when he was recommended to repeat colonoscopy.  [facility]

## 2021-02-09 NOTE — ASSESSMENT & PLAN NOTE
This is a chronic health problem for this patient.  Recent lab panel showed normal liver function test.    AST(SGOT) 12 - 45 U/L 24    ALT(SGPT) 2 - 50 U/L 26    Alkaline Phosphatase 30 - 99 U/L 53    Total Bilirubin 0.1 - 1.5 mg/dL 0.7    Albumin 3.2 - 4.9 g/dL 4.6    Total Protein 6.0 - 8.2 g/dL 7.0    Globulin 1.9 - 3.5 g/dL 2.4    A-G Ratio g/dL 1.9

## 2021-02-09 NOTE — ASSESSMENT & PLAN NOTE
This is a chronic health problem for this patient.  Not on statins.  Patient does not do any exercise as he reports that he is not able to go to gym due to pandemic.  He reports that he tries to eat healthy diet and does not eat junk food.    Lab Results   Component Value Date/Time    CHOLSTRLTOT 179 02/12/2020 0832    TRIGLYCERIDE 127 02/12/2020 0832    HDL 40 02/12/2020 0832     (H) 02/12/2020 0832    CHOLHDLRAT 4.0 07/30/2009 1113

## 2021-02-09 NOTE — ASSESSMENT & PLAN NOTE
This is a chronic health problem for this patient.  Takes Cialis.  Denies any side effects with medication.

## 2021-02-09 NOTE — PROGRESS NOTES
FAMILY MEDICINE VISIT                                                               Chief complaint::Diagnoses of Benign prostatic hyperplasia with nocturia, Erectile dysfunction, unspecified erectile dysfunction type, Prostate cancer (HCC), Prediabetes, Essential hypertension, Hx of Elevated liver enzymes, Hx of Diverticulosis of colon, Morbid obesity with BMI of 40.0-44.9, adult (HCC), RBBB (right bundle branch block), Snoring, History of colon polyps, and Elevated LDL cholesterol level were pertinent to this visit.    History of present illness: Stas Gann is a 71 y.o. male who presented to establish care, refill of medication, labs.    Benign prostatic hyperplasia with nocturia  This is a chronic health problem for this patient.  He is taking Cialis 5 mg daily but not taking Flomax.  He reports that he does have urinary problems at night time.  He reports that he has been on these medications in 3 or 4 years.  Denies any side effects with his medications.  Requested for printing prescription as his insurance does not cover this medication and he wants to buy from [facility] in [place] which is affordable for patient.      Prostate cancer (HCC)  This is a chronic health problem for this patient. reports history of prostate cancer in 2018, received 28 treatments.  Previously was following with urologist Dr. Betts but now currently not following.     Morbid obesity with BMI of 40.0-44.9, adult (HCC)  This is a chronic health problem for this patient.has not been going to gym due to pandemic.  Reports that he loves to eat but does not eat much junk food.    Snoring  This is a chronic health problem for this patient.reports that previously was evaluated for sleep apnea.  Tried using CPAP machine 5 to 6 years ago but was not able to tolerate and does not want to use CPAP machine now.     ED (erectile dysfunction)  This is a chronic health problem for this patient.  Takes Cialis.  Denies any side effects with  medication.    Elevated LDL cholesterol level  This is a chronic health problem for this patient.  Not on statins.  Patient does not do any exercise as he reports that he is not able to go to gym due to pandemic.  He reports that he tries to eat healthy diet and does not eat junk food.    Lab Results   Component Value Date/Time    CHOLSTRLTOT 179 02/12/2020 0832    TRIGLYCERIDE 127 02/12/2020 0832    HDL 40 02/12/2020 0832     (H) 02/12/2020 0832    CHOLHDLRAT 4.0 07/30/2009 1113       History of colon polyps  Reviewed last colonoscopy in media section which was done in 2014 which showed 3 polyps, biopsy results not available.  Patient reports that he does not remember when he was recommended to repeat colonoscopy.  [facility]    HTN (hypertension)  This is a chronic health problem for this patient.  His blood pressure today is borderline elevated at 140/90.  Currently taking amlodipine 10 mg and Benzapril 40 mg daily.  Reports that he is compliant with his medications.  Denies any side effects with medication.    Hx of Diverticulosis of colon  Last colonoscopy in 2014 which showed 3 colon polyps and diverticulosis.  Denies any abdominal pain, constipation, diarrhea, blood in stools.    Hx of Elevated liver enzymes  This is a chronic health problem for this patient.  Recent lab panel showed normal liver function test.    AST(SGOT) 12 - 45 U/L 24    ALT(SGPT) 2 - 50 U/L 26    Alkaline Phosphatase 30 - 99 U/L 53    Total Bilirubin 0.1 - 1.5 mg/dL 0.7    Albumin 3.2 - 4.9 g/dL 4.6    Total Protein 6.0 - 8.2 g/dL 7.0    Globulin 1.9 - 3.5 g/dL 2.4    A-G Ratio g/dL 1.9          Prediabetes  This is a chronic health problem for this patient.  Not on any medications.    Lab Results   Component Value Date/Time    HBA1C 6.0 (H) 02/12/2020 08:32 AM        RBBB (right bundle branch block)  EKG in 2014 showed right bundle branch block.  Reviewed EKG results in media section.  Denies any  chest pain, palpitations, shortness of breath, lower extremity swelling.  Denies any previous cardiac history.          Review of systems:     Review of Systems   Constitutional: Negative for chills, fever, malaise/fatigue and weight loss.   HENT: Negative for ear discharge, ear pain, hearing loss and sinus pain.    Eyes: Negative for pain, discharge and redness.   Respiratory: Negative for cough, hemoptysis, sputum production, shortness of breath and wheezing.    Cardiovascular: Negative for chest pain, palpitations and leg swelling.   Gastrointestinal: Negative for abdominal pain, constipation, diarrhea, nausea and vomiting.   Genitourinary: Negative for dysuria, frequency and urgency.   Musculoskeletal: Negative for joint pain and myalgias.   Skin: Negative for itching and rash.   Neurological: Negative for dizziness, sensory change, focal weakness and headaches.   Endo/Heme/Allergies: Negative for environmental allergies.   Psychiatric/Behavioral: Negative for depression. The patient is not nervous/anxious.         Past Medical, Surgical and Family History:    Past Medical History:   Diagnosis Date   • Hypertension      Past Surgical History:   Procedure Laterality Date   • MASS EXCISION GENERAL  2009    [facility]     Family History   Family history unknown: Yes        Social History:    Social History     Tobacco Use   • Smoking status: Former Smoker     Packs/day: 1.00     Years: 10.00     Pack years: 10.00     Types: Cigarettes     Quit date: 1980     Years since quittin.4   • Smokeless tobacco: Never Used   • Tobacco comment: Quit    Substance Use Topics   • Alcohol use: Yes     Frequency: Monthly or less   • Drug use: No        Medications and Allergies:     Current Outpatient Medications   Medication Sig Dispense Refill   • atorvastatin (LIPITOR) 10 MG Tab Take 1 Tab by mouth every day. 90 Tab 3   • tadalafil (CIALIS) 5 MG tablet Take 1  "Tab by mouth every day. 90 Tab 3   • benazepril (LOTENSIN) 40 MG tablet Take 1 Tab by mouth every day. 90 Tab 3   • amLODIPine (NORVASC) 10 MG Tab Take 1 Tab by mouth every day. 90 Tab 3   • Multiple Vitamin (MULTI-VITAMINS) Tab Take  by mouth.     • ascorbic acid (ASCORBIC ACID) 500 MG Tab Take 500 mg by mouth every day.     • tamsulosin (FLOMAX) 0.4 MG capsule Take 1 Cap by mouth ONE-HALF HOUR AFTER BREAKFAST. 90 Cap 3     No current facility-administered medications for this visit.         Allergies   Allergen Reactions   • Nkda [No Known Drug Allergy]        Vitals:    /90 (BP Location: Left arm, Patient Position: Sitting, BP Cuff Size: Adult)   Pulse 78   Temp 36.7 °C (98.1 °F)   Resp 16   Ht 1.727 m (5' 8\")   Wt 122 kg (270 lb)   SpO2 96%  Body mass index is 41.05 kg/m².    Physical Exam:     Physical Exam   Constitutional: He is well-developed, well-nourished, and in no distress. No distress.   HENT:   Head: Normocephalic and atraumatic.   Right Ear: External ear normal.   Left Ear: External ear normal.   Eyes: Conjunctivae and EOM are normal. Right eye exhibits no discharge. Left eye exhibits no discharge.   Neck: Neck supple. No thyromegaly present.   Cardiovascular: Normal rate, regular rhythm, normal heart sounds and intact distal pulses.   No murmur heard.  Pulmonary/Chest: Effort normal and breath sounds normal. No respiratory distress. He has no wheezes. He has no rales.   Abdominal: Soft. Bowel sounds are normal.   Musculoskeletal: Normal range of motion.         General: No deformity or edema.   Neurological: He is alert. Gait normal.   Skin: Skin is warm. No rash noted. He is not diaphoretic.   Psychiatric: Mood, affect and judgment normal.        Labs:  I reviewed with patient recent labs TSH, A1c, lipid panel, CMP, CBC GFR resulted on 2/12/2020    Assessment/Plan:    1. Benign prostatic hyperplasia with nocturia  Stable, continue same medication regimen.  Advised to restart " Flomax.    - CBC WITHOUT DIFFERENTIAL; Future  - Comp Metabolic Panel; Future  - PROSTATE SPECIFIC AG SCREENING; Future  - tadalafil (CIALIS) 5 MG tablet; Take 1 Tab by mouth every day.  Dispense: 90 Tab; Refill: 3    2. Erectile dysfunction, unspecified erectile dysfunction type  Stable, continue same medication regimen.    - tadalafil (CIALIS) 5 MG tablet; Take 1 Tab by mouth every day.  Dispense: 90 Tab; Refill: 3    3. Prostate cancer (HCC)  Check prostate-specific antigen for monitoring  - PROSTATE SPECIFIC AG SCREENING; Future    4. Prediabetes  Stable, recheck A1c to check control.  Advised healthy diet and aerobic exercise  - HEMOGLOBIN A1C; Future  - Lipid Profile; Future    5. Essential hypertension  Borderline control today, advised to monitor blood pressure at home.  Goal blood pressure less than 140/90.  Continue same medication regimen.  If multiple readings are above 140/90, will add another medication at next visit.    - CBC WITHOUT DIFFERENTIAL; Future  - Comp Metabolic Panel; Future  - TSH WITH REFLEX TO FT4; Future    6. Hx of Elevated liver enzymes  Stable, patient asymptomatic    7. Hx of Diverticulosis of colon  Stable, patient asymptomatic    8. Morbid obesity with BMI of 40.0-44.9, adult (HCC)  Stable, controlled with current regimen.  Continue same medication regimen.    - CBC WITHOUT DIFFERENTIAL; Future  - Comp Metabolic Panel; Future  - Patient identified as having weight management issue.  Appropriate orders and counseling given.    9. RBBB (right bundle branch block)  Stable, no acute concerns.  Discussed with patient that if he has any cardiac symptoms, then will do EKG and echocardiogram    10. Snoring  Stable, not able to tolerate CPAP machine    11. History of colon polyps  Printed colonoscopy results, advised [facility] for [facility] results to determine need for colonoscopy    12. Elevated LDL cholesterol level  Uncontrolled LDL level.  The 10-year ASCVD risk score (Molly  WALKER Becker, et al., 2013) is: 30%   Start Lipitor 10 mg once daily.  Discussed with patient about side effects of this medication.    - atorvastatin (LIPITOR) 10 MG Tab; Take 1 Tab by mouth every day.  Dispense: 90 Tab; Refill: 3  - Lipid Profile; Future  - TSH WITH REFLEX TO FT4; Future    Discussed with patient diagnosis, management options, and risk, benefits and alternative to treatment plan agreed upon.    Please note that this dictation was created using voice recognition software. I have made every reasonable attempt to correct obvious errors, but I expect that there are errors of grammar and possibly content that I did not discover before finalizing the note.    Follow up in 2 months for follow-up for labs and Medicare annual visit

## 2021-02-09 NOTE — ASSESSMENT & PLAN NOTE
This is a chronic health problem for this patient. reports history of prostate cancer in 2018, received 28 treatments.  Previously was following with urologist Dr. Betts but now currently not following.

## 2021-02-09 NOTE — ASSESSMENT & PLAN NOTE
This is a chronic health problem for this patient.has not been going to gym due to pandemic.  Reports that he loves to eat but does not eat much junk food.

## 2021-02-09 NOTE — ASSESSMENT & PLAN NOTE
Last colonoscopy in 2014 which showed 3 colon polyps and diverticulosis.  Denies any abdominal pain, constipation, diarrhea, blood in stools.

## 2021-02-09 NOTE — ASSESSMENT & PLAN NOTE
EKG in 2014 showed right bundle branch block.  Reviewed EKG results in media section.  Denies any chest pain, palpitations, shortness of breath, lower extremity swelling.  Denies any previous cardiac history.

## 2021-02-09 NOTE — ASSESSMENT & PLAN NOTE
This is a chronic health problem for this patient.  He is taking Cialis 5 mg daily but not taking Flomax.  He reports that he does have urinary problems at night time.  He reports that he has been on these medications in 3 or 4 years.  Denies any side effects with his medications.  Requested for printing prescription as his insurance does not cover this medication and he wants to buy from [facility] in [place] which is affordable for patient.

## 2021-02-09 NOTE — ASSESSMENT & PLAN NOTE
This is a chronic health problem for this patient.  Not on any medications.    Lab Results   Component Value Date/Time    HBA1C 6.0 (H) 02/12/2020 08:32 AM

## 2021-02-13 ENCOUNTER — PATIENT MESSAGE (OUTPATIENT)
Dept: MEDICAL GROUP | Facility: MEDICAL CENTER | Age: 71
End: 2021-02-13

## 2021-02-13 DIAGNOSIS — I10 ESSENTIAL HYPERTENSION: ICD-10-CM

## 2021-02-15 RX ORDER — AMLODIPINE BESYLATE 10 MG/1
10 TABLET ORAL DAILY
Qty: 90 TABLET | Refills: 3 | Status: SHIPPED | OUTPATIENT
Start: 2021-02-15 | End: 2022-01-24

## 2021-02-15 RX ORDER — BENAZEPRIL HYDROCHLORIDE 40 MG/1
40 TABLET ORAL DAILY
Qty: 90 TABLET | Refills: 3 | Status: SHIPPED | OUTPATIENT
Start: 2021-02-15 | End: 2022-01-24

## 2021-03-23 ENCOUNTER — HOSPITAL ENCOUNTER (OUTPATIENT)
Dept: LAB | Facility: MEDICAL CENTER | Age: 71
End: 2021-03-23
Attending: FAMILY MEDICINE
Payer: MEDICARE

## 2021-03-23 DIAGNOSIS — E66.01 MORBID OBESITY WITH BMI OF 40.0-44.9, ADULT (HCC): ICD-10-CM

## 2021-03-23 DIAGNOSIS — R73.03 PREDIABETES: ICD-10-CM

## 2021-03-23 DIAGNOSIS — N40.1 BENIGN PROSTATIC HYPERPLASIA WITH NOCTURIA: ICD-10-CM

## 2021-03-23 DIAGNOSIS — I10 ESSENTIAL HYPERTENSION: ICD-10-CM

## 2021-03-23 DIAGNOSIS — E78.00 ELEVATED LDL CHOLESTEROL LEVEL: ICD-10-CM

## 2021-03-23 DIAGNOSIS — R35.1 BENIGN PROSTATIC HYPERPLASIA WITH NOCTURIA: ICD-10-CM

## 2021-03-23 DIAGNOSIS — C61 MALIGNANT NEOPLASM OF PROSTATE (HCC): ICD-10-CM

## 2021-03-23 LAB
ALBUMIN SERPL BCP-MCNC: 4 G/DL (ref 3.2–4.9)
ALBUMIN/GLOB SERPL: 1.5 G/DL
ALP SERPL-CCNC: 59 U/L (ref 30–99)
ALT SERPL-CCNC: 30 U/L (ref 2–50)
ANION GAP SERPL CALC-SCNC: 7 MMOL/L (ref 7–16)
AST SERPL-CCNC: 21 U/L (ref 12–45)
BILIRUB SERPL-MCNC: 0.5 MG/DL (ref 0.1–1.5)
BUN SERPL-MCNC: 15 MG/DL (ref 8–22)
CALCIUM SERPL-MCNC: 9.6 MG/DL (ref 8.5–10.5)
CHLORIDE SERPL-SCNC: 105 MMOL/L (ref 96–112)
CHOLEST SERPL-MCNC: 118 MG/DL (ref 100–199)
CO2 SERPL-SCNC: 27 MMOL/L (ref 20–33)
CREAT SERPL-MCNC: 0.91 MG/DL (ref 0.5–1.4)
ERYTHROCYTE [DISTWIDTH] IN BLOOD BY AUTOMATED COUNT: 42.3 FL (ref 35.9–50)
EST. AVERAGE GLUCOSE BLD GHB EST-MCNC: 151 MG/DL
GLOBULIN SER CALC-MCNC: 2.6 G/DL (ref 1.9–3.5)
GLUCOSE SERPL-MCNC: 120 MG/DL (ref 65–99)
HBA1C MFR BLD: 6.9 % (ref 4–5.6)
HCT VFR BLD AUTO: 46.5 % (ref 42–52)
HDLC SERPL-MCNC: 38 MG/DL
HGB BLD-MCNC: 15.8 G/DL (ref 14–18)
LDLC SERPL CALC-MCNC: 49 MG/DL
MCH RBC QN AUTO: 29 PG (ref 27–33)
MCHC RBC AUTO-ENTMCNC: 34 G/DL (ref 33.7–35.3)
MCV RBC AUTO: 85.3 FL (ref 81.4–97.8)
PLATELET # BLD AUTO: 207 K/UL (ref 164–446)
PMV BLD AUTO: 10 FL (ref 9–12.9)
POTASSIUM SERPL-SCNC: 4.5 MMOL/L (ref 3.6–5.5)
PROT SERPL-MCNC: 6.6 G/DL (ref 6–8.2)
RBC # BLD AUTO: 5.45 M/UL (ref 4.7–6.1)
SODIUM SERPL-SCNC: 139 MMOL/L (ref 135–145)
TRIGL SERPL-MCNC: 156 MG/DL (ref 0–149)
WBC # BLD AUTO: 5.5 K/UL (ref 4.8–10.8)

## 2021-03-23 PROCEDURE — 83036 HEMOGLOBIN GLYCOSYLATED A1C: CPT | Mod: GA

## 2021-03-23 PROCEDURE — 85027 COMPLETE CBC AUTOMATED: CPT

## 2021-03-23 PROCEDURE — 80053 COMPREHEN METABOLIC PANEL: CPT

## 2021-03-23 PROCEDURE — 36415 COLL VENOUS BLD VENIPUNCTURE: CPT

## 2021-03-23 PROCEDURE — 84443 ASSAY THYROID STIM HORMONE: CPT

## 2021-03-23 PROCEDURE — 84153 ASSAY OF PSA TOTAL: CPT

## 2021-03-23 PROCEDURE — 80061 LIPID PANEL: CPT

## 2021-03-24 LAB
PSA SERPL-MCNC: 1.7 NG/ML (ref 0–4)
TSH SERPL DL<=0.005 MIU/L-ACNC: 3.49 UIU/ML (ref 0.38–5.33)

## 2021-04-06 ENCOUNTER — TELEPHONE (OUTPATIENT)
Dept: MEDICAL GROUP | Facility: MEDICAL CENTER | Age: 71
End: 2021-04-06

## 2021-04-13 ENCOUNTER — OFFICE VISIT (OUTPATIENT)
Dept: MEDICAL GROUP | Facility: MEDICAL CENTER | Age: 71
End: 2021-04-13
Payer: MEDICARE

## 2021-04-13 VITALS
HEIGHT: 68 IN | BODY MASS INDEX: 42.43 KG/M2 | DIASTOLIC BLOOD PRESSURE: 78 MMHG | RESPIRATION RATE: 20 BRPM | HEART RATE: 79 BPM | WEIGHT: 279.98 LBS | TEMPERATURE: 98.6 F | SYSTOLIC BLOOD PRESSURE: 136 MMHG | OXYGEN SATURATION: 94 %

## 2021-04-13 DIAGNOSIS — R06.83 SNORING: ICD-10-CM

## 2021-04-13 DIAGNOSIS — I45.10 RIGHT BUNDLE BRANCH BLOCK: ICD-10-CM

## 2021-04-13 DIAGNOSIS — I10 ESSENTIAL HYPERTENSION: ICD-10-CM

## 2021-04-13 DIAGNOSIS — E66.01 MORBID OBESITY WITH BMI OF 40.0-44.9, ADULT (HCC): ICD-10-CM

## 2021-04-13 DIAGNOSIS — E78.00 ELEVATED LDL CHOLESTEROL LEVEL: ICD-10-CM

## 2021-04-13 DIAGNOSIS — R35.1 BENIGN PROSTATIC HYPERPLASIA WITH NOCTURIA: ICD-10-CM

## 2021-04-13 DIAGNOSIS — C61 MALIGNANT NEOPLASM OF PROSTATE (HCC): ICD-10-CM

## 2021-04-13 DIAGNOSIS — Z79.4 TYPE 2 DIABETES MELLITUS WITHOUT COMPLICATION, WITH LONG-TERM CURRENT USE OF INSULIN (HCC): ICD-10-CM

## 2021-04-13 DIAGNOSIS — R74.8 ELEVATED LIVER ENZYMES: ICD-10-CM

## 2021-04-13 DIAGNOSIS — Z86.010 HISTORY OF COLON POLYPS: ICD-10-CM

## 2021-04-13 DIAGNOSIS — N40.1 BENIGN PROSTATIC HYPERPLASIA WITH NOCTURIA: ICD-10-CM

## 2021-04-13 DIAGNOSIS — Z00.00 MEDICARE ANNUAL WELLNESS VISIT, SUBSEQUENT: ICD-10-CM

## 2021-04-13 DIAGNOSIS — K57.30 DIVERTICULOSIS OF COLON: ICD-10-CM

## 2021-04-13 DIAGNOSIS — N52.9 ERECTILE DYSFUNCTION, UNSPECIFIED ERECTILE DYSFUNCTION TYPE: ICD-10-CM

## 2021-04-13 DIAGNOSIS — E11.9 TYPE 2 DIABETES MELLITUS WITHOUT COMPLICATION, WITH LONG-TERM CURRENT USE OF INSULIN (HCC): ICD-10-CM

## 2021-04-13 PROCEDURE — G0439 PPPS, SUBSEQ VISIT: HCPCS | Performed by: FAMILY MEDICINE

## 2021-04-13 ASSESSMENT — FIBROSIS 4 INDEX: FIB4 SCORE: 1.35

## 2021-04-13 ASSESSMENT — ENCOUNTER SYMPTOMS: GENERAL WELL-BEING: GOOD

## 2021-04-13 ASSESSMENT — ACTIVITIES OF DAILY LIVING (ADL): BATHING_REQUIRES_ASSISTANCE: 0

## 2021-04-13 ASSESSMENT — PATIENT HEALTH QUESTIONNAIRE - PHQ9: CLINICAL INTERPRETATION OF PHQ2 SCORE: 0

## 2021-04-13 NOTE — PROGRESS NOTES
Chief Complaint   Patient presents with   • Annual Exam       HPI:  Kaushik is a 71 y.o. here for Medicare Annual Wellness Visit    He is doing well.  He is going to join gym again.    He has history of prediabetes but recent labs showed A1c at 6.9 which comes now in diabetic range.  His LDL cholesterol but significantly better with atorvastatin 10 mg once daily.  But his triglyceride level got little bit up.  We reviewed his recent labs.    Patient Active Problem List    Diagnosis Date Noted   • History of colon polyps 02/09/2021   • Elevated LDL cholesterol level 02/09/2021   • Benign prostatic hyperplasia with nocturia 02/21/2019   • Morbid obesity with BMI of 40.0-44.9, adult ([facility]) 01/22/2018   • Prostate cancer ([facility]) 01/22/2018   • Hx of Diverticulosis of colon 01/08/2015   • ED (erectile dysfunction) 12/18/2014   • Hx of Elevated liver enzymes 05/06/2014   • Type 2 diabetes mellitus without complication, with long-term current use of insulin ([facility]) 05/06/2014   • HTN (hypertension) 04/29/2014   • Snoring 04/29/2014   • RBBB (right bundle branch block) 04/29/2014   • Presence of artificial left eye 04/29/2014       Current Outpatient Medications   Medication Sig Dispense Refill   • benazepril (LOTENSIN) 40 MG tablet Take 1 tablet by mouth every day. 90 tablet 3   • amLODIPine (NORVASC) 10 MG Tab Take 1 tablet by mouth every day. 90 tablet 3   • atorvastatin (LIPITOR) 10 MG Tab Take 1 Tab by mouth every day. 90 Tab 3   • tadalafil (CIALIS) 5 MG tablet Take 1 Tab by mouth every day. 90 Tab 3   • tamsulosin (FLOMAX) 0.4 MG capsule Take 1 Cap by mouth ONE-HALF HOUR AFTER BREAKFAST. 90 Cap 3   • Multiple Vitamin (MULTI-VITAMINS) Tab Take  by mouth.     • ascorbic acid (ASCORBIC ACID) 500 MG Tab Take 500 mg by mouth every day.       No current facility-administered medications for this visit.        Patient is taking medications as noted in medication list.  Current supplements as per medication list.     Allergies: Nkda  [no known drug allergy]    Current social contact/activities: Bowling.    Is patient current with immunizations? Yes.    He  reports that he quit smoking about 40 years ago. His smoking use included cigarettes. He has a 10.00 pack-year smoking history. He has never used smokeless tobacco. He reports current alcohol use. He reports that he does not use drugs.  Counseling given: Not Answered  Comment: Quit 1980      DPA/Advanced directive: Patient does not have an Advanced Directive.  A packet and workshop information was given on Advanced Directives.    ROS:    Gait: Uses no assistive device   Ostomy: No   Other tubes: No  Amputations: No   Chronic oxygen use No   Last eye exam 1 year ago  Wears hearing aids: No   : Denies any urinary leakage during the last 6 months      Screening:  Depression Screening      Little interest or pleasure in doing things?  0 - not at all  Feeling down, depressed , or hopeless? 0 - not at all  Patient Health Questionnaire Score: 0    If depressive symptoms identified deferred to follow up visit unless specifically addressed in assesment and plan.      Interpretation of PHQ-9 Total Score   Score Severity   1-4 Minimal Depression   5-9 Mild Depression   10-14 Moderate Depression   15-19 Moderately Severe Depression   20-27 Severe Depression    If depressive symptoms identified deferred to follow up visit unless specifically addressed in assessment and plan.      Screening for Cognitive Impairment  Three Minute Recall (captain, adán, picture)  3/3    Draw clock face with all 12 numbers and set the hands to show 5 past 8.  Yes    If cognitive concerns identified, deferred for follow up unless specifically addressed in assessment and plan.    Fall Risk Assessment  Has the patient had two or more falls in the last year or any fall with injury in the last year?  No  If fall risk identified, deferred for follow up unless specifically addressed in assessment and plan.    Safety  Assessment  Throw rugs on floor.  Yes  Handrails on all stairs.  No  Good lighting in all hallways.  Yes  Difficulty hearing.  No  Patient counseled about all safety risks that were identified.    Functional Assessment ADLs  Are there any barriers preventing you from cooking for yourself or meeting nutritional needs?  No.    Are there any barriers preventing you from driving safely or obtaining transportation?  No.    Are there any barriers preventing you from using a telephone or calling for help?  No.    Are there any barriers preventing you from shopping?  No.    Are there any barriers preventing you from taking care of your own finances?  No.    Are there any barriers preventing you from managing your medications?    No.    Are there any barriers preventing you from showering, bathing or dressing yourself?  No.    Are you currently engaging in any exercise or physical activity?  Yes.     What is your perception of your health?  Good.    Health Maintenance Summary                Annual Wellness Visit Next Due 2022      Done 2021 Visit Dx: Medicare annual wellness visit, subsequent     Patient has more history with this topic...    COLONOSCOPY Next Due 2024      Done 2014 AMB REFERRAL TO [facility] FOR COLONOSCOPY    IMM DTaP/Tdap/Td Vaccine Next Due 9/3/2025      Done 9/3/2015 Imm Admin: Tdap Vaccine          Patient Care Team:  Ronnell Marcelo M.D. as PCP - General (Family Medicine)  Raul Santiago M.D. as Consulting Physician (Radiation Oncology)  Mauri Kulkarni M.D. as Consulting Physician (Pain Management)    Social History     Tobacco Use   • Smoking status: Former Smoker     Packs/day: 1.00     Years: 10.00     Pack years: 10.00     Types: Cigarettes     Quit date: 1980     Years since quittin.6   • Smokeless tobacco: Never Used   • Tobacco comment: Quit    Substance Use Topics   • Alcohol use: Yes   • Drug use: No     Family History   Family history unknown: Yes     He  has a  "past medical history of Hypertension.   Past Surgical History:   Procedure Laterality Date   • MASS EXCISION GENERAL  5/19/2009    Performed by CARSON BRAND at [facility]         Exam:   /78 (BP Location: Left arm, Patient Position: Sitting, BP Cuff Size: Adult)   Pulse 79   Temp 37 °C (98.6 °F) (Temporal)   Resp 20   Ht 1.727 m (5' 8\")   Wt (!) 127 kg (279 lb 15.8 oz)   SpO2 94%  Body mass index is 42.57 kg/m².    Hearing good.    Dentition good  Alert, oriented in no acute distress.  Eye contact is good, speech goal directed, affect calm      Assessment and Plan. The following treatment and monitoring plan is recommended:      1. Medicare annual wellness visit, subsequent    2. Snoring    3. RBBB (right bundle branch block)    4. Prostate cancer (HCC)    5. Type 2 diabetes mellitus without complication, with long-term current use of insulin (HCC)  - HEMOGLOBIN A1C; Future  - MICROALBUMIN CREAT RATIO URINE; Future    6. Morbid obesity with BMI of 40.0-44.9, adult (HCC)    7. Hx of Elevated liver enzymes    8. Hx of Diverticulosis of colon    9. Essential hypertension    10. History of colon polyps    11. Elevated LDL cholesterol level  - Lipid Profile; Future    12. Erectile dysfunction, unspecified erectile dysfunction type    13. Benign prostatic hyperplasia with nocturia       All medical conditions are stable except diabetes and hyperlipidemia.  His A1c came back at 6.9.  We talked about eating healthy diet and starting aerobic exercise regimen for weight loss.  We will monitor his A1c in 6 months.  Triglyceride level elevated, advised to eat healthy diet and aerobic exercise.  Continue atorvastatin 10 mg once daily.  Blood pressure stable, continue same medication regimen at this time.    Services suggested: No services needed at this time  Health Care Screening recommendations as per orders if indicated.  Referrals offered: PT/OT/Nutrition counseling/Behavioral Health/Smoking " cessation as per orders if indicated.    Discussion today about general wellness and lifestyle habits:    · Prevent falls and reduce trip hazards; Cautioned about securing or removing rugs.  · Have a working fire alarm and carbon monoxide detector;   · Engage in regular physical activity and social activities     Follow-up: In 6 months for follow-up for labs.

## 2021-06-09 NOTE — TELEPHONE ENCOUNTER
ESTABLISHED PATIENT PRE-VISIT PLANNING     Patient was NOT contacted to complete PVP.     Note: Patient will not be contacted if there is no indication to call.     1.  Reviewed notes from the last few office visits within the medical group: Yes    2.  If any orders were placed at last visit or intended to be done for this visit (i.e. 6 mos follow-up), do we have Results/Consult Notes?         •  Labs - Labs ordered, completed on 03/23/21 and results are in chart.  Note: If patient appointment is for lab review and patient did not complete labs, check with provider if OK to reschedule patient until labs completed.       •  Imaging - Imaging was not ordered at last office visit.       •  Referrals - No referrals were ordered at last office visit.    3. Is this appointment scheduled as a Hospital Follow-Up? No    4.  Immunizations were updated in Epic using Reconcile Outside Information activity? Yes    5.  Patient is due for the following Health Maintenance Topics:   Health Maintenance Due   Topic Date Due   • Annual Wellness Visit  02/21/2021        Simple: Patient demonstrates quick and easy understanding/Verbalized Understanding

## 2021-12-14 DIAGNOSIS — E78.00 ELEVATED LDL CHOLESTEROL LEVEL: ICD-10-CM

## 2021-12-14 RX ORDER — ATORVASTATIN CALCIUM 10 MG/1
TABLET, FILM COATED ORAL
Qty: 90 TABLET | Refills: 1 | Status: SHIPPED | OUTPATIENT
Start: 2021-12-14 | End: 2022-05-23

## 2021-12-15 NOTE — TELEPHONE ENCOUNTER
Received request via: Pharmacy    Was the patient seen in the last year in this department? Yes    Does the patient have an active prescription (recently filled or refills available) for medication(s) requested? No    Requested Prescriptions     Pending Prescriptions Disp Refills   • atorvastatin (LIPITOR) 10 MG Tab [Pharmacy Med Name: ATORVASTATIN TABS 10MG] 90 Tablet 3     Sig: TAKE 1 TABLET DAILY

## 2022-01-23 DIAGNOSIS — I10 ESSENTIAL HYPERTENSION: ICD-10-CM

## 2022-01-24 RX ORDER — AMLODIPINE BESYLATE 10 MG/1
TABLET ORAL
Qty: 90 TABLET | Refills: 0 | Status: SHIPPED | OUTPATIENT
Start: 2022-01-24 | End: 2022-04-25

## 2022-01-24 RX ORDER — BENAZEPRIL HYDROCHLORIDE 40 MG/1
TABLET ORAL
Qty: 90 TABLET | Refills: 0 | Status: SHIPPED | OUTPATIENT
Start: 2022-01-24 | End: 2022-04-25

## 2022-03-23 ENCOUNTER — HOSPITAL ENCOUNTER (OUTPATIENT)
Dept: RADIOLOGY | Facility: MEDICAL CENTER | Age: 72
End: 2022-03-23
Attending: FAMILY MEDICINE
Payer: MEDICARE

## 2022-03-23 ENCOUNTER — OFFICE VISIT (OUTPATIENT)
Dept: MEDICAL GROUP | Facility: MEDICAL CENTER | Age: 72
End: 2022-03-23
Payer: MEDICARE

## 2022-03-23 VITALS
HEART RATE: 70 BPM | WEIGHT: 273.37 LBS | TEMPERATURE: 98.2 F | HEIGHT: 67 IN | RESPIRATION RATE: 14 BRPM | BODY MASS INDEX: 42.91 KG/M2 | SYSTOLIC BLOOD PRESSURE: 124 MMHG | DIASTOLIC BLOOD PRESSURE: 72 MMHG | OXYGEN SATURATION: 70 %

## 2022-03-23 DIAGNOSIS — C61 MALIGNANT NEOPLASM OF PROSTATE (HCC): ICD-10-CM

## 2022-03-23 DIAGNOSIS — Z79.4 TYPE 2 DIABETES MELLITUS WITHOUT COMPLICATION, WITH LONG-TERM CURRENT USE OF INSULIN (HCC): ICD-10-CM

## 2022-03-23 DIAGNOSIS — E78.2 ELEVATED TRIGLYCERIDES WITH HIGH CHOLESTEROL: ICD-10-CM

## 2022-03-23 DIAGNOSIS — E66.01 MORBID OBESITY WITH BMI OF 40.0-44.9, ADULT (HCC): ICD-10-CM

## 2022-03-23 DIAGNOSIS — M25.561 ACUTE PAIN OF RIGHT KNEE: ICD-10-CM

## 2022-03-23 DIAGNOSIS — E11.9 TYPE 2 DIABETES MELLITUS WITHOUT COMPLICATION, WITH LONG-TERM CURRENT USE OF INSULIN (HCC): ICD-10-CM

## 2022-03-23 PROCEDURE — 99214 OFFICE O/P EST MOD 30 MIN: CPT | Performed by: FAMILY MEDICINE

## 2022-03-23 PROCEDURE — 73560 X-RAY EXAM OF KNEE 1 OR 2: CPT | Mod: RT

## 2022-03-23 ASSESSMENT — FIBROSIS 4 INDEX: FIB4 SCORE: 1.37

## 2022-03-23 ASSESSMENT — ENCOUNTER SYMPTOMS
FEVER: 0
CHILLS: 0
PALPITATIONS: 0

## 2022-03-23 ASSESSMENT — PATIENT HEALTH QUESTIONNAIRE - PHQ9: CLINICAL INTERPRETATION OF PHQ2 SCORE: 0

## 2022-03-23 NOTE — ASSESSMENT & PLAN NOTE
New health problem, no redness, swelling over joint.  Full range of motion.  Likely sprained his ACL.  Check a knee x-ray and referral to physical therapy for strengthening exercises.  Counseled regarding weight loss.

## 2022-03-23 NOTE — ASSESSMENT & PLAN NOTE
Chronic health problem, last A1c was at 6.9, recheck A1c to assess control.  We will discuss about medications depending upon his A1c.  Counseled regarding eating healthy diet.  Foot exam done today, normal.  [facility] and [facility].  Recommended to bring records when he get eye exam.

## 2022-03-23 NOTE — ASSESSMENT & PLAN NOTE
Chronic health problem, recheck lipid panel to assess control.  Counseled regarding eating healthy diet and to exercise.

## 2022-03-23 NOTE — ASSESSMENT & PLAN NOTE
Counseled regarding importance of weight loss, eat healthy diet and to exercise 150 minutes in a week.

## 2022-03-23 NOTE — PROGRESS NOTES
FAMILY MEDICINE VISIT                                                               Chief complaint::Diagnoses of Morbid obesity with BMI of 40.0-44.9, adult (HCC), Malignant neoplasm of prostate (HCC), Type 2 diabetes mellitus without complication, with long-term current use of insulin (HCC), Acute pain of right knee, and Elevated triglycerides with high cholesterol were pertinent to this visit.    History of present illness: Stas Gann is a 72 y.o. male who presented for right knee pain    Problem   Acute Pain of Right Knee    He was bowling in the beginning of this month on 3/4/2022 and when he got up from position from bowling he noticed right knee pain.  Denies any pop, direct trauma to his knees.  Denies any fall.  Knee pain lasted for 2 days and then got better and next Friday he went again for bowling and started having knee pain.  He looked online and got ACL brace which helped a lot with symptoms.  He took ibuprofen which helped with symptoms.  He reports only mild knee pain now.  There is no redness or swelling of her knee.     Elevated Triglycerides With High Cholesterol    Last lipid panel in March 2021 showed elevated triglyceride level and low HDL.  He is on atorvastatin 10 mg daily.    Lab Results   Component Value Date/Time    CHOLSTRLTOT 118 03/23/2021 0911    TRIGLYCERIDE 156 (H) 03/23/2021 0911    HDL 38 (A) 03/23/2021 0911    LDL 49 03/23/2021 0911    CHOLHDLRAT 4.0 07/30/2009 1113        Morbid obesity with BMI of 40.0-44.9, adult (HCC)    He reports that he has not been actively trying to lose weight.  His BMI is currently at 42.82     Prostate Cancer ([facility])    Was diagnosed with prostate cancer in 2018, received 28 treatments of radiation therapy.  Last PSA was normal range.  We will recheck his PSA level.  Previously was following with urology, currently not following.               Type 2 Diabetes Mellitus Without Complication, With Long-Term Current Use of Insulin (Hcc)    Last  "A1c came back at 6.9.  He is currently not on any medication.    Lab Results   Component Value Date/Time    HBA1C 6.9 (H) 03/23/2021 09:11 AM        Monofilament testing with a 10 gram force: sensation intact: intact bilaterally  Visual Inspection: Feet without maceration, ulcers, fissures.  Pedal pulses: intact bilaterally              Review of systems:     Review of Systems   Constitutional: Negative for chills, fever and malaise/fatigue.   Cardiovascular: Negative for chest pain, palpitations and leg swelling.   Musculoskeletal: Positive for joint pain.        Right knee pain        Medications and Allergies:     Current Outpatient Medications   Medication Sig Dispense Refill   • amLODIPine (NORVASC) 10 MG Tab TAKE 1 TABLET DAILY 90 Tablet 0   • benazepril (LOTENSIN) 40 MG tablet TAKE 1 TABLET DAILY 90 Tablet 0   • atorvastatin (LIPITOR) 10 MG Tab TAKE 1 TABLET DAILY 90 Tablet 1   • tadalafil (CIALIS) 5 MG tablet Take 1 Tab by mouth every day. 90 Tab 3   • Multiple Vitamin (MULTI-VITAMINS) Tab Take  by mouth.       No current facility-administered medications for this visit.          Vitals:    /72 (BP Location: Left arm, Patient Position: Sitting, BP Cuff Size: Adult)   Pulse 70   Temp 36.8 °C (98.2 °F) (Temporal)   Resp 14   Ht 1.702 m (5' 7\")   Wt 124 kg (273 lb 5.9 oz)   SpO2 (!) 70%  Body mass index is 42.82 kg/m².    Physical Exam:     Physical Exam  Constitutional:       General: He is not in acute distress.  HENT:      Head: Normocephalic and atraumatic.   Eyes:      Conjunctiva/sclera: Conjunctivae normal.   Cardiovascular:      Rate and Rhythm: Normal rate.   Pulmonary:      Effort: Pulmonary effort is normal. No respiratory distress.   Musculoskeletal:         General: No deformity.      Cervical back: Neck supple.      Comments: Right knees: There is no effusion, oozing, erythema, warmth, or gross deformity.  No TTP along joint lines, pt has FROM, no crepitus, strength 5/5.  No laxity or " pain with varus/valgus stress.   Skin:     Findings: No rash.   Neurological:      Mental Status: He is alert.      Gait: Gait is intact.   Psychiatric:         Mood and Affect: Mood and affect normal.         Judgment: Judgment normal.         Assessment/Plan:    Problem List Items Addressed This Visit     Acute pain of right knee     New health problem, no redness, swelling over joint.  Full range of motion.  Likely sprained his ACL.  Check a knee x-ray and referral to physical therapy for strengthening exercises.  Counseled regarding weight loss.         Relevant Orders    Referral to Physical Therapy    DX-KNEE 2- RIGHT    Elevated triglycerides with high cholesterol     Chronic health problem, recheck lipid panel to assess control.  Counseled regarding eating healthy diet and to exercise.         Relevant Orders    Comp Metabolic Panel    Lipid Profile    TSH WITH REFLEX TO FT4    Morbid obesity with BMI of 40.0-44.9, adult ([facility])     Counseled regarding importance of weight loss, eat healthy diet and to exercise 150 minutes in a week.         Relevant Orders    Patient identified as having weight management issue.  Appropriate orders and counseling given.    Prostate cancer ([facility])     Chronic health problem, stable, check PSA level.         Type 2 diabetes mellitus without complication, with long-term current use of insulin ([facility])     Chronic health problem, last A1c was at 6.9, recheck A1c to assess control.  We will discuss about medications depending upon his A1c.  Counseled regarding eating healthy diet.  Foot exam done today, normal.  [facility] and [facility].  Recommended to bring records when he get eye exam.         Relevant Orders    Comp Metabolic Panel    HEMOGLOBIN A1C    Diabetic Monofilament Lower Extremity Exam (Completed)           Please note that this dictation was created using voice recognition software. I have made every reasonable attempt to  correct obvious errors, but I expect that there are errors of grammar and possibly content that I did not discover before finalizing the note.    Follow up on 4/19/2022 for lab follow up

## 2022-03-30 ENCOUNTER — HOSPITAL ENCOUNTER (OUTPATIENT)
Dept: LAB | Facility: MEDICAL CENTER | Age: 72
End: 2022-03-30
Attending: FAMILY MEDICINE
Payer: MEDICARE

## 2022-03-30 DIAGNOSIS — C61 MALIGNANT NEOPLASM OF PROSTATE (HCC): ICD-10-CM

## 2022-03-30 DIAGNOSIS — E78.00 ELEVATED LDL CHOLESTEROL LEVEL: ICD-10-CM

## 2022-03-30 DIAGNOSIS — Z79.4 TYPE 2 DIABETES MELLITUS WITHOUT COMPLICATION, WITH LONG-TERM CURRENT USE OF INSULIN (HCC): ICD-10-CM

## 2022-03-30 DIAGNOSIS — E78.2 ELEVATED TRIGLYCERIDES WITH HIGH CHOLESTEROL: ICD-10-CM

## 2022-03-30 DIAGNOSIS — E11.9 TYPE 2 DIABETES MELLITUS WITHOUT COMPLICATION, WITH LONG-TERM CURRENT USE OF INSULIN (HCC): ICD-10-CM

## 2022-03-30 LAB
ALBUMIN SERPL BCP-MCNC: 4.3 G/DL (ref 3.2–4.9)
ALBUMIN/GLOB SERPL: 2 G/DL
ALP SERPL-CCNC: 63 U/L (ref 30–99)
ALT SERPL-CCNC: 39 U/L (ref 2–50)
ANION GAP SERPL CALC-SCNC: 10 MMOL/L (ref 7–16)
AST SERPL-CCNC: 24 U/L (ref 12–45)
BILIRUB SERPL-MCNC: 0.7 MG/DL (ref 0.1–1.5)
BUN SERPL-MCNC: 14 MG/DL (ref 8–22)
CALCIUM SERPL-MCNC: 9.6 MG/DL (ref 8.5–10.5)
CHLORIDE SERPL-SCNC: 103 MMOL/L (ref 96–112)
CHOLEST SERPL-MCNC: 112 MG/DL (ref 100–199)
CO2 SERPL-SCNC: 26 MMOL/L (ref 20–33)
CREAT SERPL-MCNC: 0.83 MG/DL (ref 0.5–1.4)
CREAT UR-MCNC: 206.04 MG/DL
EST. AVERAGE GLUCOSE BLD GHB EST-MCNC: 126 MG/DL
FASTING STATUS PATIENT QL REPORTED: NORMAL
GFR SERPLBLD CREATININE-BSD FMLA CKD-EPI: 92 ML/MIN/1.73 M 2
GLOBULIN SER CALC-MCNC: 2.2 G/DL (ref 1.9–3.5)
GLUCOSE SERPL-MCNC: 122 MG/DL (ref 65–99)
HBA1C MFR BLD: 6 % (ref 4–5.6)
HDLC SERPL-MCNC: 45 MG/DL
LDLC SERPL CALC-MCNC: 42 MG/DL
MICROALBUMIN UR-MCNC: 1.7 MG/DL
MICROALBUMIN/CREAT UR: 8 MG/G (ref 0–30)
POTASSIUM SERPL-SCNC: 4.5 MMOL/L (ref 3.6–5.5)
PROT SERPL-MCNC: 6.5 G/DL (ref 6–8.2)
PSA SERPL-MCNC: 1.15 NG/ML (ref 0–4)
SODIUM SERPL-SCNC: 139 MMOL/L (ref 135–145)
TRIGL SERPL-MCNC: 126 MG/DL (ref 0–149)
TSH SERPL DL<=0.005 MIU/L-ACNC: 3.54 UIU/ML (ref 0.38–5.33)

## 2022-03-30 PROCEDURE — 36415 COLL VENOUS BLD VENIPUNCTURE: CPT

## 2022-03-30 PROCEDURE — 80061 LIPID PANEL: CPT

## 2022-03-30 PROCEDURE — 82043 UR ALBUMIN QUANTITATIVE: CPT

## 2022-03-30 PROCEDURE — 83036 HEMOGLOBIN GLYCOSYLATED A1C: CPT | Mod: GA

## 2022-03-30 PROCEDURE — 84443 ASSAY THYROID STIM HORMONE: CPT

## 2022-03-30 PROCEDURE — 84153 ASSAY OF PSA TOTAL: CPT

## 2022-03-30 PROCEDURE — 80053 COMPREHEN METABOLIC PANEL: CPT

## 2022-03-30 PROCEDURE — 82570 ASSAY OF URINE CREATININE: CPT

## 2022-04-06 ENCOUNTER — PHYSICAL THERAPY (OUTPATIENT)
Dept: PHYSICAL THERAPY | Facility: MEDICAL CENTER | Age: 72
End: 2022-04-06
Attending: FAMILY MEDICINE
Payer: MEDICARE

## 2022-04-06 DIAGNOSIS — M25.561 ACUTE PAIN OF RIGHT KNEE: ICD-10-CM

## 2022-04-06 PROCEDURE — 97110 THERAPEUTIC EXERCISES: CPT

## 2022-04-06 PROCEDURE — 97161 PT EVAL LOW COMPLEX 20 MIN: CPT

## 2022-04-06 SDOH — ECONOMIC STABILITY: GENERAL: QUALITY OF LIFE: FAIR

## 2022-04-06 ASSESSMENT — ENCOUNTER SYMPTOMS
QUALITY: DEEP
PAIN TIMING: CONTINUOUSLY
PAIN SCALE: 0
PAIN LOCATION: RIGHT KNEE
QUALITY: ACHING
PAIN TIMING: OCCASIONAL
PAIN SCALE AT HIGHEST: 3
AWAKENINGS PER NIGHT: 2

## 2022-04-06 NOTE — OP THERAPY EVALUATION
"  Outpatient Physical Therapy  INITIAL EVALUATION    [facility]  [address]  [place] [address]  Phone:  [phone]  Fax:  [phone]    Date of Evaluation: 04/06/2022    Patient: Kaushik Gann  YOB: 1950  MRN: [id number]     Referring Provider: Ronnell Marcelo M.D.  [address]  [place] [address]   Referring Diagnosis Acute pain of right knee [M25.561]     Time Calculation  Start time: 1115  Stop time: 1155 Time Calculation (min): 40 minutes         Chief Complaint: Knee Problem    Visit Diagnoses     ICD-10-CM   1. Acute pain of right knee  M25.561       Date of onset of impairment: 3/4/2022    Subjective:   History of Present Illness:     Date of onset:  3/4/2022    Mechanism of injury:  Per Physician Note: \"He was bowling in the beginning of this month on 3/4/2022 and when he got up from position from bowling he noticed right knee pain.  Denies any pop, direct trauma to his knees.  Denies any fall.  Knee pain lasted for 2 days and then got better and next Friday he went again for bowling and started having knee pain.  He looked online and got ACL brace which helped a lot with symptoms.  He took ibuprofen which helped with symptoms.  He reports only mild knee pain now.  There is no redness or swelling of her knee\"    Kaushik reports that he was bowling in the beginning of March for 3 games. During the 3rd game he mentioned that his knee \"gave out\". After a few hours he was not able to bear weight and was in complete pain. The pain lasted for about 2 days and was able to reduce pain. He mentions that he tried bowling the following week and after 3rd round he started having pain to the right knee. He reports the pain towards the medial and lateral aspect of the distal quad and pain towards the inside of the knee. He uses compression sleeve that has helped reduce the pain. He has worn it for about 3-4 hours " at a time, and uses the compression when he starts having knee pain.    He mentions that he has minimal pain when going up or down a few steps. He denies taking pain medications to reduce his knee pain.       Quality of life:  Fair  Sleep disturbance:  Interrupted sleep  # Times/Night awakened:  2  Pain:     Current pain ratin    At worst pain rating:  3    Location:  Right knee     Quality:  Deep and aching (dull pain )    Pain timing:  Continuously and occasional    Alleviating factors: compression sleeve.    Exacerbated by: Bowling, up/down stairs     Progression:  Improving  Social Support:     Lives in:  One-story house (coming into  garage has few steps )    Lives with:  Alone  Activities of Daily Living:     Patient reported ADL status: Bowling for 3-4 games at a time   Patient Goals:     Patient goals for therapy:  Decreased pain and return to work      Past Medical History:   Diagnosis Date   • Hypertension      Past Surgical History:   Procedure Laterality Date   • MASS EXCISION GENERAL  2009    Performed by [facility] at SURGERY SAME DAY [facility]     Social History     Tobacco Use   • Smoking status: Former Smoker     Packs/day: 1.00     Years: 10.00     Pack years: 10.00     Types: Cigarettes     Quit date: 1980     Years since quittin.6   • Smokeless tobacco: Never Used   • Tobacco comment: Quit    Substance Use Topics   • Alcohol use: Yes     Family and Occupational History     Socioeconomic History   • Marital status: Single     Spouse name: Not on file   • Number of children: Not on file   • Years of education: Not on file   • Highest education level: Not on file   Occupational History   • Not on file       Objective     Observations     Additional Observation Details  Squat  - weight over toes, knees over toes. No pain to the knee     Bowling Form  - ends form with main weight on the left leg with minimal ER of the hip and lifts right leg up as right knee is flexed  in the air    Gait  Bilateral hips into ER, too many toes sign bilaterally    Active Range of Motion   Left Knee   Flexion: 122 degrees   Extension: -2 degrees     Right Knee   Flexion: 122 degrees   Extension: -2 degrees     Passive Range of Motion   Left Hip   Flexion: 95 degrees   External rotation (90/90): 35 degrees   Internal rotation (90/90): 15 degrees     Right Hip   Flexion: 95 degrees   External rotation (90/90): 35 degrees   Internal rotation (90/90): 15 degrees     Strength:      Left Hip   Planes of Motion   Flexion: 4    Right Hip   Planes of Motion   Flexion: 5    Left Knee   Flexion: 5  Extension: 5    Right Knee   Flexion: 5  Extension: 5    Left Ankle/Foot   Dorsiflexion: 5  Plantar flexion: 5    Right Ankle/Foot   Dorsiflexion: 5  Plantar flexion: 5    Tests     Left Knee   Negative anterior drawer, anterior Lachman and posterior drawer.     Right Knee   Positive valgus stress test at 0 degrees and valgus stress test at 30 degrees.   Negative anterior drawer, anterior Lachman, posterior drawer, varus stress test at 0 degrees and varus stress test at 30 degrees.     Additional Tests Details  Quad test in prone  - limited bilaterally with left leg (105)  more limited compared to the right (110)    SLR  - negative   - Left: 55 degrees  - Right: 50 degrees         Therapeutic Exercises (CPT 72592):     1. See below       Therapeutic Exercise Summary: Access Code: [email or web address]  URL: [email or web address]  Date: 04/06/2022  Prepared by: Mya Trejo    Exercises  Seated Long Arc Quad - 1 x daily - 5-7 x weekly - 3 sets - 10 reps  Long Sitting Quad Set - 1 x daily - 5-7 x weekly - 3 sets - 10 reps  Supine Active Straight Leg Raise - 1 x daily - 5-7 x weekly - 3 sets - 10 reps  Wall Squat - 1 x daily - 5-7 x weekly - 5 reps - 20 sec hold        Time-based treatments/modalities:    Physical Therapy Timed Treatment Charges  Therapeutic exercise minutes (CPT 87286): 8 minutes      Assessment, Response  and Plan:   Impairments: activity intolerance, limited mobility and pain with function    Assessment details:  Kaushik is a pleasant 72 year old with complaints of right knee pain after activity and bowling. Based on the initial evaluation, it was noted that he has limited strength to right hip, irritation to the medial aspect of the right knee based on the valgus tests in supine, limited mobility of the quad and hamstrings. He will benefit with skilled therapy to reduce his pain, strengthen his quads and improve deficits mentioned above to improve his CLOF.    Barriers to therapy:  None  Prognosis: good    Goals:   Short Term Goals:   1. Patient will be able to have uninterrupted sleep for two nights straight without increase in pain to the right knee.  2. Patient will be able to have 5/5 strength on his right hip for symmetry of strength on the hips.  3. Patient will be able to bowl for 3 games with the brace with no increase in pain to the knee.  Short term goal time span:  2-4 weeks      Long Term Goals:    1. Patient will be able to bowl for 3 games without the brace with no increase in pain to the knee.  2. Patient will be able to go up 2 steps without increase pain to the knee.  3. Patient will be able to go down 2 steps without increase pain to the knee.   4. Pt will be independent with HEP  Long term goal time span:  4-6 weeks    Plan:   Therapy options:  Physical therapy treatment to continue  Planned therapy interventions:  Manual Therapy (CPT 73250), Therapeutic Exercise (CPT 26943), Therapeutic Activities (CPT 49115), E Stim Unattended (CPT 58589) and Neuromuscular Re-education (CPT 02113)  Frequency:  2x week  Duration in weeks:  4  Duration in visits:  8      Functional Assessment Used  WOMAC Grand Total: 13.54     Referring provider co-signature:  I have reviewed this plan of care and my co-signature certifies the need for services.    Certification Period: 04/06/2022 to  05/11/22    Physician Signature:  ________________________________ Date: ______________       [x] As the licensed therapist supervising this student, I was present during the entire treatment session directing the care and reviewing the assessment plan.  I reviewed all documentation prior to signing.

## 2022-04-13 ENCOUNTER — PHYSICAL THERAPY (OUTPATIENT)
Dept: PHYSICAL THERAPY | Facility: MEDICAL CENTER | Age: 72
End: 2022-04-13
Attending: FAMILY MEDICINE
Payer: MEDICARE

## 2022-04-13 DIAGNOSIS — M25.561 ACUTE PAIN OF RIGHT KNEE: ICD-10-CM

## 2022-04-13 PROCEDURE — 97140 MANUAL THERAPY 1/> REGIONS: CPT

## 2022-04-13 PROCEDURE — 97110 THERAPEUTIC EXERCISES: CPT

## 2022-04-13 NOTE — OP THERAPY DAILY TREATMENT
Outpatient Physical Therapy  DAILY TREATMENT     [facility] Out[facility]  [address]  [place] [address]  Phone:  [phone]  Fax:  [phone]    Date: 04/13/2022    Patient: Kaushik Gann  YOB: 1950  MRN: [id number]     Time Calculation    Start time: 1117  Stop time: 1200 Time Calculation (min): 43 minutes         Chief Complaint: Knee Problem    Visit #: 2    SUBJECTIVE:  Patient states that he has noticed some improvement to his knee overall. When trying the wall exercise, he has some discomforting pain to the medial and lateral inferior aspect of the right knee. He mentions that he would want to go back to the gym as he has had a membership for about 3-4 years now. He would want to go back for 2x week at the gym.     OBJECTIVE:  Current objective measures:           Therapeutic Exercises (CPT 75614):     1. Recumbent bike , resistance at 4, seat at 7, 5 min     2. Knee flexion/Ext in Cage , 10x     3. Review exercises , wall sits with less flexion to knee     4. SL balance with ball toss , next visit     5. Gastroc Stretch , 2 min     6. Quad stretch , 30 secs each side     7. Sit to stands , 5x       Therapeutic Exercise Summary: Access Code: [email or web address]  URL: [email or web address]  Date: 04/06/2022  Prepared by: Mya Trejo    Exercises  Seated Long Arc Quad - 1 x daily - 5-7 x weekly - 3 sets - 10 reps  Long Sitting Quad Set - 1 x daily - 5-7 x weekly - 3 sets - 10 reps  Supine Active Straight Leg Raise - 1 x daily - 5-7 x weekly - 3 sets - 10 reps  Wall Squat - 1 x daily - 5-7 x weekly - 5 reps - 20 sec hold      Therapeutic Treatments and Modalities:     1. Manual Therapy (CPT 59584), IASTM right knee, patella mobs (caudal and cephalad glides)    Time-based treatments/modalities:    Physical Therapy Timed Treatment Charges  Manual therapy minutes (CPT 53869): 10 minutes  Therapeutic exercise minutes (CPT 74997): 30  minutes  ASSESSMENT:   Response to treatment: Patient focused on stabilization of the knee and strengthening exercises. Able to modify the wall sits as he was having increased pain to the knee by having him position himself with less knee flexion. He was able to tolerate the IASTM. Will work on stabilization of the knee through balance tasks next visit.     PLAN/RECOMMENDATIONS:   Plan for treatment: therapy treatment to continue next visit.  Planned interventions for next visit: continue with current treatment.      [x] As the licensed therapist supervising this student, I was present during the entire treatment session directing the care and reviewing the assessment plan.  I reviewed all documentation prior to signing.    .

## 2022-04-15 ENCOUNTER — PHYSICAL THERAPY (OUTPATIENT)
Dept: PHYSICAL THERAPY | Facility: MEDICAL CENTER | Age: 72
End: 2022-04-15
Attending: FAMILY MEDICINE
Payer: MEDICARE

## 2022-04-15 DIAGNOSIS — M25.561 ACUTE PAIN OF RIGHT KNEE: ICD-10-CM

## 2022-04-15 PROCEDURE — 97140 MANUAL THERAPY 1/> REGIONS: CPT

## 2022-04-15 PROCEDURE — 97110 THERAPEUTIC EXERCISES: CPT

## 2022-04-15 NOTE — OP THERAPY DAILY TREATMENT
Outpatient Physical Therapy  DAILY TREATMENT     [facility] Outpatient [facility] Therapy  [address]  [place] [address]  Phone:  [phone]  Fax:  [phone]    Date: 04/15/2022    Patient: Kaushik Gann  YOB: 1950  MRN: [id number]     Time Calculation    Start time: 1117  Stop time: 1200 Time Calculation (min): 43 minutes         Chief Complaint: Knee Problem    Visit #: 3    SUBJECTIVE:  Patient states he was doing well after last visit. He did not have residual pain to the right knee.     OBJECTIVE:  Current objective measures:           Therapeutic Exercises (CPT 61858):     1. Recumbent bike , resistance at 4, seat at 7, 5 min     2. Knee flexion in Cage , 10x , ext windshield wipers     3. Cone touches , 5 cones x 5    4. SL balance , 20 secs x 3     5. Gastroc Stretch , not today     6. Quad stretch , not today     7. Sit to stands , not today     8. Lateral squats to metal step TRX, 10x each side     9. Treadmill push offs, 2x10 each side       Therapeutic Exercise Summary: Access Code: [email or web address]  URL: [email or web address]  Date: 04/06/2022  Prepared by: Mya Trejo    Exercises  Seated Long Arc Quad - 1 x daily - 5-7 x weekly - 3 sets - 10 reps  Long Sitting Quad Set - 1 x daily - 5-7 x weekly - 3 sets - 10 reps  Supine Active Straight Leg Raise - 1 x daily - 5-7 x weekly - 3 sets - 10 reps  Wall Squat - 1 x daily - 5-7 x weekly - 5 reps - 20 sec hold      Therapeutic Treatments and Modalities:     1. Manual Therapy (CPT 89249), IASTM right knee, resisted band on knee with squats 15x     Time-based treatments/modalities:    Physical Therapy Timed Treatment Charges  Manual therapy minutes (CPT 08533): 10 minutes  Therapeutic exercise minutes (CPT 00153): 30 minutes    ASSESSMENT:   Response to treatment: Patient focused on progressive exercises for stability of the knee. He was able to tolerate the new exercises this session. With the  treadmill exercise, he had more impact to the quad with the stance leg instead of the push off leg. Will continue to progress patient with loading exercises for the knee to get him back into bowling next week.     PLAN/RECOMMENDATIONS:   Plan for treatment: therapy treatment to continue next visit.  Planned interventions for next visit: continue with current treatment.      [x] As the licensed therapist supervising this student, I was present during the entire treatment session directing the care and reviewing the assessment plan.  I reviewed all documentation prior to signing.

## 2022-04-19 ENCOUNTER — OFFICE VISIT (OUTPATIENT)
Dept: MEDICAL GROUP | Facility: MEDICAL CENTER | Age: 72
End: 2022-04-19
Payer: MEDICARE

## 2022-04-19 VITALS
HEART RATE: 72 BPM | WEIGHT: 271.17 LBS | TEMPERATURE: 97.3 F | RESPIRATION RATE: 18 BRPM | OXYGEN SATURATION: 96 % | BODY MASS INDEX: 42.56 KG/M2 | HEIGHT: 67 IN | DIASTOLIC BLOOD PRESSURE: 70 MMHG | SYSTOLIC BLOOD PRESSURE: 130 MMHG

## 2022-04-19 DIAGNOSIS — Z86.010 HISTORY OF COLON POLYPS: ICD-10-CM

## 2022-04-19 DIAGNOSIS — I45.10 RBBB (RIGHT BUNDLE BRANCH BLOCK): ICD-10-CM

## 2022-04-19 DIAGNOSIS — Z00.00 MEDICARE ANNUAL WELLNESS VISIT, SUBSEQUENT: ICD-10-CM

## 2022-04-19 DIAGNOSIS — M25.561 ACUTE PAIN OF RIGHT KNEE: ICD-10-CM

## 2022-04-19 DIAGNOSIS — Z97.0 PRESENCE OF ARTIFICIAL LEFT EYE: ICD-10-CM

## 2022-04-19 DIAGNOSIS — K57.30 DIVERTICULOSIS OF COLON: ICD-10-CM

## 2022-04-19 DIAGNOSIS — C61 PROSTATE CANCER (HCC): ICD-10-CM

## 2022-04-19 DIAGNOSIS — E66.01 MORBID OBESITY WITH BMI OF 40.0-44.9, ADULT (HCC): ICD-10-CM

## 2022-04-19 DIAGNOSIS — N40.1 BENIGN PROSTATIC HYPERPLASIA WITH NOCTURIA: ICD-10-CM

## 2022-04-19 DIAGNOSIS — E11.9 TYPE 2 DIABETES MELLITUS WITHOUT COMPLICATION, WITH LONG-TERM CURRENT USE OF INSULIN (HCC): ICD-10-CM

## 2022-04-19 DIAGNOSIS — Z79.4 TYPE 2 DIABETES MELLITUS WITHOUT COMPLICATION, WITH LONG-TERM CURRENT USE OF INSULIN (HCC): ICD-10-CM

## 2022-04-19 DIAGNOSIS — R06.83 SNORING: ICD-10-CM

## 2022-04-19 DIAGNOSIS — R74.8 ELEVATED LIVER ENZYMES: ICD-10-CM

## 2022-04-19 DIAGNOSIS — N52.9 ERECTILE DYSFUNCTION, UNSPECIFIED ERECTILE DYSFUNCTION TYPE: ICD-10-CM

## 2022-04-19 DIAGNOSIS — E78.2 ELEVATED TRIGLYCERIDES WITH HIGH CHOLESTEROL: ICD-10-CM

## 2022-04-19 DIAGNOSIS — R35.1 BENIGN PROSTATIC HYPERPLASIA WITH NOCTURIA: ICD-10-CM

## 2022-04-19 DIAGNOSIS — I10 PRIMARY HYPERTENSION: ICD-10-CM

## 2022-04-19 PROCEDURE — G0439 PPPS, SUBSEQ VISIT: HCPCS | Performed by: FAMILY MEDICINE

## 2022-04-19 RX ORDER — TADALAFIL 5 MG/1
5 TABLET ORAL DAILY
Qty: 90 TABLET | Refills: 3 | Status: SHIPPED | OUTPATIENT
Start: 2022-04-19 | End: 2022-04-27 | Stop reason: SDUPTHER

## 2022-04-19 ASSESSMENT — ACTIVITIES OF DAILY LIVING (ADL): BATHING_REQUIRES_ASSISTANCE: 0

## 2022-04-19 ASSESSMENT — PATIENT HEALTH QUESTIONNAIRE - PHQ9: CLINICAL INTERPRETATION OF PHQ2 SCORE: 0

## 2022-04-19 ASSESSMENT — FIBROSIS 4 INDEX: FIB4 SCORE: 1.37

## 2022-04-19 ASSESSMENT — ENCOUNTER SYMPTOMS: GENERAL WELL-BEING: GOOD

## 2022-04-19 NOTE — PROGRESS NOTES
Chief Complaint   Patient presents with   • Annual Exam       HPI:  Kaushik is a 72 y.o. here for Medicare Annual Wellness Visit      Patient Active Problem List    Diagnosis Date Noted   • Acute pain of right knee 03/23/2022   • History of colon polyps 02/09/2021   • Elevated triglycerides with high cholesterol 02/09/2021   • Benign prostatic hyperplasia with nocturia 02/21/2019   • Morbid obesity with BMI of 40.0-44.9, adult (HCC) 01/22/2018   • Prostate cancer ([facility]) 01/22/2018   • Hx of Diverticulosis of colon 01/08/2015   • ED (erectile dysfunction) 12/18/2014   • Hx of Elevated liver enzymes 05/06/2014   • Type 2 diabetes mellitus without complication, with long-term current use of insulin (HCC) 05/06/2014   • HTN (hypertension) 04/29/2014   • Snoring 04/29/2014   • RBBB (right bundle branch block) 04/29/2014   • Presence of artificial left eye 04/29/2014       Current Outpatient Medications   Medication Sig Dispense Refill   • tadalafil (CIALIS) 5 MG tablet Take 1 Tablet by mouth every day. 90 Tablet 3   • amLODIPine (NORVASC) 10 MG Tab TAKE 1 TABLET DAILY 90 Tablet 0   • benazepril (LOTENSIN) 40 MG tablet TAKE 1 TABLET DAILY 90 Tablet 0   • atorvastatin (LIPITOR) 10 MG Tab TAKE 1 TABLET DAILY 90 Tablet 1   • Multiple Vitamin (MULTI-VITAMINS) Tab Take  by mouth.       No current facility-administered medications for this visit.        Patient is taking medications as noted in medication list.  Current supplements as per medication list.     Allergies: Nkda [no known drug allergy]    Current social contact/activities:     Is patient current with immunizations? Yes.    He  reports that he quit smoking about 41 years ago. His smoking use included cigarettes. He has a 10.00 pack-year smoking history. He has never used smokeless tobacco. He reports current alcohol use. He reports that he does not use drugs.  Counseling given: Not Answered  Comment: Quit 1980      DPA/Advanced directive: Patient does not have an  Advanced Directive.  A packet and workshop information was given on Advanced Directives.    ROS:    Gait: Uses no assistive device   Ostomy: No   Other tubes: No   Amputations: No   Chronic oxygen use No   Last eye exam ONE YEAR AGO  Wears hearing aids: No  : Denies any urinary leakage during the last 6 months      Screening:    Depression Screening  Little interest or pleasure in doing things?  0 - not at all  Feeling down, depressed, or hopeless? 0 - not at all    If depressive symptoms identified deferred to follow up visit unless specifically addressed in assessment and plan.    Interpretation of PHQ-9 Total Score   Score Severity   1-4 No Depression   5-9 Mild Depression   10-14 Moderate Depression   15-19 Moderately Severe Depression   20-27 Severe Depression      Screening for Cognitive Impairment  Three Minute Recall (daughter, heaven, mountain)  2/3    Draw clock face with all 12 numbers and set the hands to show 10 past 11.  Yes    If cognitive concerns identified, deferred for follow up unless specifically addressed in assessment and plan.    Fall Risk Assessment  Has the patient had two or more falls in the last year or any fall with injury in the last year?  No  If fall risk identified, deferred for follow up unless specifically addressed in assessment and plan.    Safety Assessment  Throw rugs on floor.  Yes  Handrails on all stairs.  Yes  Good lighting in all hallways.  Yes  Difficulty hearing.  No  Patient counseled about all safety risks that were identified.    Functional Assessment ADLs  Are there any barriers preventing you from cooking for yourself or meeting nutritional needs?  No.    Are there any barriers preventing you from driving safely or obtaining transportation?  No.    Are there any barriers preventing you from using a telephone or calling for help?  No.    Are there any barriers preventing you from shopping?  No.    Are there any barriers preventing you from taking care of your own  finances?  No.    Are there any barriers preventing you from managing your medications?    No.    Are there any barriers preventing you from showering, bathing or dressing yourself?  No.    Are you currently engaging in any exercise or physical activity?  Yes.     What is your perception of your health?  Good.    Health Maintenance Summary          Overdue - RETINAL SCREENING (Yearly) Overdue - never done    No completion history exists for this topic.          Ordered - A1C SCREENING (Every 6 Months) Ordered on 4/19/2022 03/30/2022  HEMOGLOBIN A1C    03/23/2021  HEMOGLOBIN A1C    02/12/2020  HEMOGLOBIN A1C    02/13/2019  HEMOGLOBIN A1C    01/16/2018  HEMOGLOBIN A1C    Only the first 5 history entries have been loaded, but more history exists.          DIABETES MONOFILAMENT / LE EXAM (Yearly) Next due on 3/23/2023    03/23/2022  Diabetic Monofilament Lower Extremity Exam    03/23/2022  [employer]: WORKFLOW - DIABETES - DIABETIC FOOT EXAM PERFORMED          FASTING LIPID PROFILE (Yearly) Next due on 3/30/2023    03/30/2022  Lipid Profile    03/23/2021  Lipid Profile    02/12/2020  Lipid Profile    02/13/2019  Lipid Profile    01/16/2018  LIPID PROFILE    Only the first 5 history entries have been loaded, but more history exists.          URINE ACR / MICROALBUMIN (Yearly) Next due on 3/30/2023    03/30/2022  MICROALBUMIN CREAT RATIO URINE          Ordered - SERUM CREATININE (Yearly) Ordered on 4/19/2022 03/30/2022  Comp Metabolic Panel    03/23/2021  Comp Metabolic Panel    02/12/2020  Comp Metabolic Panel    02/13/2019  COMP METABOLIC PANEL    01/16/2018  COMP METABOLIC PANEL    Only the first 5 history entries have been loaded, but more history exists.          Annual Wellness Visit (Every 366 Days) Next due on 4/20/2023 04/19/2022  Visit Dx: Medicare annual wellness visit, subsequent    04/13/2021  Visit Dx: Medicare annual wellness visit, subsequent    02/21/2020  Visit Dx: Medicare annual wellness visit,  subsequent    02/21/2019  Visit Dx: Medicare annual wellness visit, subsequent    02/21/2019  Subsequent Annual Wellness Visit - Includes PPPS ()    Only the first 5 history entries have been loaded, but more history exists.          COLORECTAL CANCER SCREENING (COLONOSCOPY - Every 10 Years) Tentatively due on 8/13/2024 08/13/2014  AMB REFERRAL TO GI FOR COLONOSCOPY          IMM DTaP/Tdap/Td Vaccine (2 - Td or Tdap) Next due on 9/3/2025    09/03/2015  Imm Admin: Tdap Vaccine          HEPATITIS C SCREENING  Completed    06/02/2014  Hep C Ab Screen [facility]          IMM PNEUMOCOCCAL VACCINE: 65+ Years (Series Information) Completed    01/06/2017  Imm Admin: Pneumococcal polysaccharide vaccine (PPSV-23)    12/18/2014  Imm Admin: Pneumococcal Conjugate Vaccine (Prevnar/PCV-13)          IMM ZOSTER VACCINES (Series Information) Completed    05/23/2018  Imm Admin: Zoster Vaccine Recombinant (RZV) (SHINGRIX)    03/12/2018  Imm Admin: Zoster Vaccine Recombinant (RZV) (SHINGRIX)          IMM INFLUENZA (Series Information) Completed    10/05/2021  Imm Admin: Influenza Vaccine Adult HD    09/30/2020  Imm Admin: Influenza (IM) Preservative Free - HISTORICAL DATA    10/10/2019  Imm Admin: Influenza Vaccine Adult HD    10/02/2018  Imm Admin: Influenza Vaccine Quad Inj (Pf)    10/01/2018  Imm Admin: Influenza Vaccine Adult HD    Only the first 5 history entries have been loaded, but more history exists.          COVID-19 Vaccine (Series Information) Completed    12/11/2021  Imm Admin: Pfizer SARS-CoV-2 Vaccine 12+    03/12/2021  Imm Admin: Moderna SARS-CoV-2 Vaccine    02/12/2021  Imm Admin: Moderna SARS-CoV-2 Vaccine          IMM HEP B VACCINE (Series Information) Aged Out    No completion history exists for this topic.          IMM MENINGOCOCCAL VACCINE (MCV4) (Series Information) Aged Out    No completion history exists for this topic.                Patient Care Team:  Ronnell Marcelo M.D. as PCP - General (Family  "Medicine)  Raul Santiago M.D. as Consulting Physician (Radiation Oncology)  Mauri Kulkarni M.D. as Consulting Physician (Physical Medicine & Rehabilitation Pain Medicine)  Mya Trejo, PT  Mya Trejo, PT as Physical Therapist (Physical Therapy)    Social History     Tobacco Use   • Smoking status: Former Smoker     Packs/day: 1.00     Years: 10.00     Pack years: 10.00     Types: Cigarettes     Quit date: 1980     Years since quittin.6   • Smokeless tobacco: Never Used   • Tobacco comment: Quit    Substance Use Topics   • Alcohol use: Yes   • Drug use: No     Family History   Family history unknown: Yes     He  has a past medical history of Hypertension.   Past Surgical History:   Procedure Laterality Date   • MASS EXCISION GENERAL  2009    Performed by CARSON BRAND at SURGERY SAME DAY [facility] ORS         Exam:   /70 (BP Location: Left arm, Patient Position: Sitting, BP Cuff Size: Adult)   Pulse 72   Temp 36.3 °C (97.3 °F)   Resp 18   Ht 1.702 m (5' 7\")   Wt 123 kg (271 lb 2.7 oz)   SpO2 96%  Body mass index is 42.47 kg/m².    Hearing good.    Dentition good  Alert, oriented in no acute distress  Eye contact is good, speech goal directed, affect calm      Assessment and Plan. The following treatment and monitoring plan is recommended:      1. Medicare annual wellness visit, subsequent    2. Erectile dysfunction, unspecified erectile dysfunction type  - tadalafil (CIALIS) 5 MG tablet; Take 1 Tablet by mouth every day.  Dispense: 90 Tablet; Refill: 3    3. Benign prostatic hyperplasia with nocturia  - tadalafil (CIALIS) 5 MG tablet; Take 1 Tablet by mouth every day.  Dispense: 90 Tablet; Refill: 3    4. Type 2 diabetes mellitus without complication, with long-term current use of insulin ([facility])  - Comp Metabolic Panel; Future  - HEMOGLOBIN A1C; Future    5. Snoring    6. RBBB (right bundle branch block)    7. Morbid obesity with BMI of 40.0-44.9, adult ([facility])    8. Hx of " Elevated liver enzymes    9. Hx of Diverticulosis of colon    10. Primary hypertension    11. History of colon polyps    12. Elevated triglycerides with high cholesterol    13. Acute pain of right knee     Problem   Acute Pain of Right Knee    He was bowling in the beginning of this month on 3/4/2022 and when he got up from position from bowling he noticed right knee pain.  Denies any pop, direct trauma to his knees.  Denies any fall.  Knee pain lasted for 2 days and then got better and next Friday he went again for bowling and started having knee pain.  He looked online and got ACL brace which helped a lot with symptoms.  He took ibuprofen which helped with symptoms.  He reports only mild knee pain now.  There is no redness or swelling of her knee.  He is currently doing physical therapy for his knee which is helping a lot with symptoms.     History of Colon Polyps    [facility] had 3 polyps, biopsy results are not available.  Request records.  Per patient, recommended to do repeat colonoscopy in 10 years     Elevated Triglycerides With High Cholesterol    Last lipid panel in March 2022, showed cholesterol levels in normal range.  He is on atorvastatin 10 mg daily.    Lab Results   Component Value Date/Time    CHOLSTRLTOT 112 03/30/2022 0924    TRIGLYCERIDE 126 03/30/2022 0924    HDL 45 03/30/2022 0924    LDL 42 03/30/2022 0924    CHOLHDLRAT 4.0 07/30/2009 1113                                              Benign Prostatic Hyperplasia With Nocturia    He is currently on Cialis 5 mg daily for benign prostatic hyperplasia, doing well on this medication.  Requested refill for this prescription.     Morbid obesity with BMI of 40.0-44.9, adult (HCC)    He reports that he has not been actively trying to lose weight.  His BMI is currently at 42.47.  He is working on improving his diet and planning to go to gym.  He is doing physical therapy for his knee which is helping him to be  more active.     Prostate Cancer (Hcc)    Was diagnosed with prostate cancer in 2018, received 28 treatments of radiation therapy.  PSA level came back in normal range.  Previously was following with urology, currently not following.        Hx of Diverticulosis of colon    Last colonoscopy in 2014 showed 3 polyps, diverticulosis.  Per patient recommended to repeat in 10 years.  Request records.     Ed (Erectile Dysfunction)    Takes Cialis, requested refill.  No side effects with this medication.     Hx of Elevated liver enzymes    Recent liver function test came back normal     Type 2 Diabetes Mellitus Without Complication, With Long-Term Current Use of Insulin (Hcc)    Last A1c came back at 6.0.  He is currently not on any medication.  His A1c improved from 6.9.  He has been doing physical therapy for knee and planning to go to gym.    Lab Results   Component Value Date/Time    HBA1C 6.0 (H) 03/30/2022 09:24 AM        Monofilament testing with a 10 gram force: sensation intact: intact bilaterally  Visual Inspection: Feet without maceration, ulcers, fissures.  Pedal pulses: intact bilaterally     Htn (Hypertension)    Blood pressure today 130/70.  He is taking benazepril 40 mg daily and amlodipine 10 mg daily doing well with these medications.     Snoring    Was previously on CPAP machine, tried that 5 to 6 years ago.  He was not able to tolerate it and he does not want to use CPAP machine.     Rbbb (Right Bundle Branch Block)    EKG in 2014 showed right bundle branch block.  Reviewed EKG results in media section.  Denies any chest pain, palpitations, shortness of breath, lower extremity swelling.  Denies any previous cardiac history.  Continue to monitor if he develops any symptoms, will repeat EKG     Presence of Artificial Left Eye    Stable, follow-up with ophthalmology for regular eye exam       Services suggested: No services needed at this time  Health Care Screening recommendations as per orders if  indicated.  Referrals offered: PT/OT/Nutrition counseling/Behavioral Health/Smoking cessation as per orders if indicated.    Discussion today about general wellness and lifestyle habits:    · Prevent falls and reduce trip hazards; Cautioned about securing or removing rugs.  · Have a working fire alarm and carbon monoxide detector;   · Engage in regular physical activity and social activities.     Follow-up: No follow-ups on file.

## 2022-04-22 ENCOUNTER — PHYSICAL THERAPY (OUTPATIENT)
Dept: PHYSICAL THERAPY | Facility: MEDICAL CENTER | Age: 72
End: 2022-04-22
Attending: FAMILY MEDICINE
Payer: MEDICARE

## 2022-04-22 DIAGNOSIS — M25.561 ACUTE PAIN OF RIGHT KNEE: ICD-10-CM

## 2022-04-22 PROCEDURE — 97110 THERAPEUTIC EXERCISES: CPT

## 2022-04-22 NOTE — OP THERAPY DAILY TREATMENT
Outpatient Physical Therapy  DAILY TREATMENT     [facility] Outpatient Physical Therapy  [address]  [place] [address]  Phone:  [phone]  Fax:  [phone]    Date: 04/22/2022    Patient: Kaushik Gann  YOB: 1950  MRN: [id number]     Time Calculation    Start time: 1200  Stop time: 1240 Time Calculation (min): 40 minutes         Chief Complaint: Knee Problem    Visit #: 4    SUBJECTIVE:  Patient stated that he been doing well since last visit with no residual pain. He states that he went to the gym three times this week for about an hour. He works on his legs and arms. He will be bowling next Monday for a practice round to see how his knee is doing then will play at the [employer] the following Friday.     OBJECTIVE:  Current objective measures:           Therapeutic Exercises (CPT 57811):     1. Recumbent bike , resistance at 4, seat at 7, 5 min     2. Knee flexion in Cage , 10x , ext windshield wipers     3. SL cone touches , 10x each side     4. SL balance , 20 secs x 5 each side with airex pad    8. TRX squats , 2x20    10. Sliders, 2x10 each side     11. Knee extension, Blue band 2x10    12. Gastroc stretch on incline, 2 min     13. Knee extensions, seated with 4lb x20      Therapeutic Exercise Summary: Access Code: [email or web address]  URL: [email or web address]  Date: 04/06/2022  Prepared by: Mya Trejo    Exercises  Seated Long Arc Quad - 1 x daily - 5-7 x weekly - 3 sets - 10 reps  Long Sitting Quad Set - 1 x daily - 5-7 x weekly - 3 sets - 10 reps  Supine Active Straight Leg Raise - 1 x daily - 5-7 x weekly - 3 sets - 10 reps  Wall Squat - 1 x daily - 5-7 x weekly - 5 reps - 20 sec hold    Gave patient verbal instructions to work on resisted knee extensions with resistance band and cone touches 10x each side at the gym        Time-based treatments/modalities:    Physical Therapy Timed Treatment Charges  Therapeutic exercise minutes (CPT  02028): 40 minutes    ASSESSMENT:   Response to treatment: Patient focused on progressive exercises for strengthening of the hamstrings and quads to reduce tension to the knee. He was able to tolerate new exercises for stability of the knee. He noticed that his legs were getting fatigue towards the middle of the session but did not have an increase in pain. Will be giving him an updated HEP based on how he does from bowling on Monday.     PLAN/RECOMMENDATIONS:   Plan for treatment: therapy treatment to continue next visit.  Planned interventions for next visit: continue with current treatment.      [x] As the licensed therapist supervising this student, I was present during the entire treatment session directing the care and reviewing the assessment plan.  I reviewed all documentation prior to signing.

## 2022-04-27 ENCOUNTER — PHYSICAL THERAPY (OUTPATIENT)
Dept: PHYSICAL THERAPY | Facility: MEDICAL CENTER | Age: 72
End: 2022-04-27
Attending: FAMILY MEDICINE
Payer: MEDICARE

## 2022-04-27 DIAGNOSIS — M25.561 ACUTE PAIN OF RIGHT KNEE: ICD-10-CM

## 2022-04-27 PROCEDURE — 97110 THERAPEUTIC EXERCISES: CPT

## 2022-04-27 NOTE — OP THERAPY DAILY TREATMENT
Outpatient Physical Therapy  DAILY TREATMENT     [facility] Outpatient Physical Therapy  [address]  [place] [address]  Phone:  [phone]  Fax:  [phone]    Date: 04/27/2022    Patient: Kaushik Gann  YOB: 1950  MRN: [id number]     Time Calculation    Start time: 1115  Stop time: 1158 Time Calculation (min): 43 minutes         Chief Complaint: Knee Problem    Visit #: 5    SUBJECTIVE:  Patient states that he went bowling for 2 games on Monday wearing the brace. He reported no pain to his right knee and had no soreness the following day. He thinks his right knee is doing better these past few days. He has not been to the gym this past week as he has been busy.      OBJECTIVE:  Current objective measures:           Therapeutic Exercises (CPT 36551):     1. Recumbent bike , resistance at 5, 5 min     2. Knee flexion in Cage , 2 min     3. SL cone touches , 10x each side     4. SL balance , 30 secs x 3    5. Eccentric Step downs , 4in step 10x     6. Lunges     8. Sit to stands with resistance , 15x     10. Sliders, 2x10 each side     13. Knee extensions, Green band 2x10     14. Leg press, 7 cords, 2x10       Therapeutic Exercise Summary: Access Code: [email or web address]  URL: [email or web address]  Date: 04/06/2022  Prepared by: Mya Trejo    Exercises  Seated Long Arc Quad - 1 x daily - 5-7 x weekly - 3 sets - 10 reps  Long Sitting Quad Set - 1 x daily - 5-7 x weekly - 3 sets - 10 reps  Supine Active Straight Leg Raise - 1 x daily - 5-7 x weekly - 3 sets - 10 reps  Wall Squat - 1 x daily - 5-7 x weekly - 5 reps - 20 sec hold    Gave patient verbal instructions to work on resisted knee extensions with resistance band and cone touches 10x each side at the gym      Access Code: [email or web address]  URL: [email or web address]  Date: 04/27/2022  Prepared by: Mya Trejo    Exercises  Single Leg Cone Touch - 1 x daily - 7 x weekly - 2 sets - 10 reps  Squat  with Chair Touch and Resistance Loop - 1 x daily - 7 x weekly - 3 sets - 10 reps  Single Leg Stance - 7 x weekly - 3 sets - 30 sec hold  Seated Knee Extension with Resistance - 1 x daily - 7 x weekly - 3 sets - 10 reps  Full Leg Press - 1 x daily - 7 x weekly - 3 sets - 10 reps            Time-based treatments/modalities:    Physical Therapy Timed Treatment Charges  Therapeutic exercise minutes (CPT 83195): 40 minutes    ASSESSMENT:   Response to treatment: Patient worked on progressive exercises for stability of the knee and strengthening of the quads. He has been able to tolerate the exercises well when increasing in reps and sets with no pain to the knee. He was sent with a HEP to continue to work on at the gym.     PLAN/RECOMMENDATIONS:   Plan for treatment: therapy treatment to continue next visit.  Planned interventions for next visit: continue with current treatment.      [x] As the licensed therapist supervising this student, I was present during the entire treatment session directing the care and reviewing the assessment plan.  I reviewed all documentation prior to signing.

## 2022-04-29 ENCOUNTER — APPOINTMENT (OUTPATIENT)
Dept: PHYSICAL THERAPY | Facility: MEDICAL CENTER | Age: 72
End: 2022-04-29
Attending: FAMILY MEDICINE
Payer: MEDICARE

## 2022-05-04 ENCOUNTER — PHYSICAL THERAPY (OUTPATIENT)
Dept: PHYSICAL THERAPY | Facility: MEDICAL CENTER | Age: 72
End: 2022-05-04
Attending: FAMILY MEDICINE
Payer: MEDICARE

## 2022-05-04 DIAGNOSIS — M25.561 ACUTE PAIN OF RIGHT KNEE: ICD-10-CM

## 2022-05-04 PROCEDURE — 97110 THERAPEUTIC EXERCISES: CPT

## 2022-05-04 NOTE — OP THERAPY DAILY TREATMENT
Outpatient Physical Therapy  DAILY TREATMENT     [facility] Outpatient Physical Therapy  [address]  [place] [address]  Phone:  [phone]  Fax:  [phone]    Date: 05/04/2022    Patient: Kaushik Gann  YOB: 1950  MRN: [id number]     Time Calculation    Start time: 1115  Stop time: 1144 Time Calculation (min): 29 minutes     Chief Complaint: Knee Problem    Visit #: 6    SUBJECTIVE:  The pt states that he is about 95% better overall since starting physical therapy. He states that he has been able to bowl with less pain. He states that he still has some discomfort with deep knee bends, but everything else has improved. The pt has consistently been going to the gym doing resistance machines, weights, and the treadmill. He is confident in transition to independent HEP after today.     OBJECTIVE:  WOMAC Grand Total: 5.21     Therapeutic Exercises (CPT 74243):     1. Recumbent bike , resistance at 5, 6 min     2. Knee flexion in Cage , 2 min     3. SL cone touches , 0    4. SL balance , 30 secs x 3    5. Eccentric Step downs , 6 inch step 4 rounds    6. Knee extensions, 2x10, 3# ankle weight     7. Leg press, 2x10, 5 cords    8. Sit to stands with resistance , 2x10, 7.5#      Therapeutic Exercise Summary: Access Code: [email or web address]  URL: [email or web address]  Date: 04/06/2022  Prepared by: Mya Trejo    Exercises  Seated Long Arc Quad - 1 x daily - 5-7 x weekly - 3 sets - 10 reps  Long Sitting Quad Set - 1 x daily - 5-7 x weekly - 3 sets - 10 reps  Supine Active Straight Leg Raise - 1 x daily - 5-7 x weekly - 3 sets - 10 reps  Wall Squat - 1 x daily - 5-7 x weekly - 5 reps - 20 sec hold    Gave patient verbal instructions to work on resisted knee extensions with resistance band and cone touches 10x each side at the gym      Access Code: [email or web address]  URL: [email or web address]  Date: 04/27/2022  Prepared by: Mya Trejo    Exercises  Single Leg  Cone Touch - 1 x daily - 7 x weekly - 2 sets - 10 reps  Squat with Chair Touch and Resistance Loop - 1 x daily - 7 x weekly - 3 sets - 10 reps  Single Leg Stance - 7 x weekly - 3 sets - 30 sec hold  Seated Knee Extension with Resistance - 1 x daily - 7 x weekly - 3 sets - 10 reps  Full Leg Press - 1 x daily - 7 x weekly - 3 sets - 10 reps            Time-based treatments/modalities:    Physical Therapy Timed Treatment Charges  Therapeutic exercise minutes (CPT 54956): 27 minutes    ASSESSMENT:   Response to treatment: See discharge summary    PLAN/RECOMMENDATIONS:   Plan for treatment: discharge patient due to accomplished goals.  Planned interventions for next visit: Pt to transition to independent HEP.

## 2022-05-04 NOTE — OP THERAPY DISCHARGE SUMMARY
Outpatient Physical Therapy  DISCHARGE SUMMARY NOTE      [facility]  [address]  [place] [address]  Phone:  [phone]  Fax:  [phone]    Date of Visit: 05/04/2022    Patient: Kaushik Gann  YOB: 1950  MRN: [id number]     Referring Provider: Ronnell Marcelo M.D.  [address]  [place] [address]   Referring Diagnosis Pain in right knee [M25.561]         Functional Assessment Used  WOMAC Grand Total: 5.21     Your patient is being discharged from Physical Therapy with the following comments:   · Goals met    Comments:  Kaushik Gann has been seen for 6 PT visits since the start of care for R knee pain. He has made overall functional improvement in his knee pain during IADLs and recreational tasks, LE strength, single limb balance, knee mobility, and independence with HEP. The pt has met majority of his initially established PT goals and demonstrates good self-efficacy with HEP. The pt will benefit from discharge from skilled therapy at this time with ongoing participation in HEP to maintain functional gains made during PT intervention.     Limitations Remaining:  Short Term Goals:   1. Patient will be able to have uninterrupted sleep for two nights straight without increase in pain to the right knee (MET with regards to knee pain, not met with regards to sleep tolerance)  2. Patient will be able to have 5/5 strength on his right hip for symmetry of strength on the hips (MET)  3. Patient will be able to bowl for 3 games with the brace with no increase in pain to the knee (MET)  Short term goal time span:  2-4 weeks      Long Term Goals:    1. Patient will be able to bowl for 3 games without the brace with no increase in pain to the knee (Partially met, wears sleeve)  2. Patient will be able to go up 2 steps without increase pain to the knee (MET)  3. Patient will be able to go down 2 steps without increase  pain to the knee (MET)  4. Pt will be independent with HEP (MET)    Recommendations:  It is recommended that pt continue with participation in independent HEP. If symptoms return, recommend that pt request a new PT referral from his physician.     Mya Trejo, PT    Date: 5/4/2022

## 2022-05-06 ENCOUNTER — APPOINTMENT (OUTPATIENT)
Dept: PHYSICAL THERAPY | Facility: MEDICAL CENTER | Age: 72
End: 2022-05-06
Attending: FAMILY MEDICINE
Payer: MEDICARE

## 2022-05-23 DIAGNOSIS — E78.00 ELEVATED LDL CHOLESTEROL LEVEL: ICD-10-CM

## 2022-05-23 RX ORDER — ATORVASTATIN CALCIUM 10 MG/1
TABLET, FILM COATED ORAL
Qty: 90 TABLET | Refills: 3 | Status: SHIPPED | OUTPATIENT
Start: 2022-05-23 | End: 2023-05-16

## 2022-10-19 ENCOUNTER — OFFICE VISIT (OUTPATIENT)
Dept: MEDICAL GROUP | Facility: MEDICAL CENTER | Age: 72
End: 2022-10-19
Payer: MEDICARE

## 2022-10-19 VITALS
TEMPERATURE: 97.8 F | DIASTOLIC BLOOD PRESSURE: 62 MMHG | RESPIRATION RATE: 17 BRPM | HEIGHT: 67 IN | WEIGHT: 273.37 LBS | HEART RATE: 72 BPM | OXYGEN SATURATION: 95 % | SYSTOLIC BLOOD PRESSURE: 122 MMHG | BODY MASS INDEX: 42.91 KG/M2

## 2022-10-19 DIAGNOSIS — C61 PROSTATE CANCER (HCC): ICD-10-CM

## 2022-10-19 DIAGNOSIS — Z79.4 TYPE 2 DIABETES MELLITUS WITHOUT COMPLICATION, WITH LONG-TERM CURRENT USE OF INSULIN (HCC): ICD-10-CM

## 2022-10-19 DIAGNOSIS — Z13.6 SCREENING FOR AAA (ABDOMINAL AORTIC ANEURYSM): ICD-10-CM

## 2022-10-19 DIAGNOSIS — E11.9 TYPE 2 DIABETES MELLITUS WITHOUT COMPLICATION, WITH LONG-TERM CURRENT USE OF INSULIN (HCC): ICD-10-CM

## 2022-10-19 DIAGNOSIS — I10 PRIMARY HYPERTENSION: ICD-10-CM

## 2022-10-19 DIAGNOSIS — Z23 NEED FOR VACCINATION: ICD-10-CM

## 2022-10-19 PROBLEM — M17.11 PRIMARY OSTEOARTHRITIS OF RIGHT KNEE: Status: ACTIVE | Noted: 2022-03-23

## 2022-10-19 LAB
HBA1C MFR BLD: 5.8 % (ref 0–5.6)
INT CON NEG: NEGATIVE
INT CON POS: POSITIVE

## 2022-10-19 PROCEDURE — 83036 HEMOGLOBIN GLYCOSYLATED A1C: CPT | Performed by: FAMILY MEDICINE

## 2022-10-19 PROCEDURE — 99214 OFFICE O/P EST MOD 30 MIN: CPT | Mod: 25 | Performed by: FAMILY MEDICINE

## 2022-10-19 PROCEDURE — 90746 HEPB VACCINE 3 DOSE ADULT IM: CPT | Performed by: FAMILY MEDICINE

## 2022-10-19 PROCEDURE — G0010 ADMIN HEPATITIS B VACCINE: HCPCS | Performed by: FAMILY MEDICINE

## 2022-10-19 ASSESSMENT — FIBROSIS 4 INDEX: FIB4 SCORE: 1.37

## 2022-10-19 ASSESSMENT — ENCOUNTER SYMPTOMS
PALPITATIONS: 0
CHILLS: 0
FEVER: 0
COUGH: 0
WHEEZING: 0
SHORTNESS OF BREATH: 0

## 2022-10-19 ASSESSMENT — PATIENT HEALTH QUESTIONNAIRE - PHQ9: CLINICAL INTERPRETATION OF PHQ2 SCORE: 0

## 2022-10-19 NOTE — ASSESSMENT & PLAN NOTE
Chronic problem, improving and stable without medications.  Continue to eat healthy diet and exercise.  Recheck labs in 6-month.  Request eye exam records.

## 2022-10-19 NOTE — LETTER
[facility]  Ronnell Marcelo M.D.  [address]  [place] [address]  Fax: [phone]   Authorization for Release/Disclosure of   Protected Health Information   Name: STAS GANN : 1950 SSN: [id number]   Address: [address] [place] [address]  [place] [address] Phone:    [phone] (home)    I authorize the entity listed below to release/disclose the PHI below to:   [facility]/Ronnell Marcelo M.D. and Ronnell Marcelo M.D.   Provider or Entity Name:     Address   City, State, Zip   Phone:      Fax:     Reason for request: continuity of care   Information to be released:    [  ] LAST COLONOSCOPY,  including any PATH REPORT and follow-up  [  ] LAST FIT/COLOGUARD RESULT [  ] LAST DEXA  [  ] LAST MAMMOGRAM  [  ] LAST PAP  [  ] LAST LABS [  ] RETINA EXAM REPORT  [  ] IMMUNIZATION RECORDS  [  ] Release all info      [  ] Check here and initial the line next to each item to release ALL health information INCLUDING  _____ Care and treatment for drug and / or alcohol abuse  _____ HIV testing, infection status, or AIDS  _____ Genetic Testing    DATES OF SERVICE OR TIME PERIOD TO BE DISCLOSED: _____________  I understand and acknowledge that:  * This Authorization may be revoked at any time by you in writing, except if your health information has already been used or disclosed.  * Your health information that will be used or disclosed as a result of you signing this authorization could be re-disclosed by the recipient. If this occurs, your re-disclosed health information may no longer be protected by State or Federal laws.  * You may refuse to sign this Authorization. Your refusal will not affect your ability to obtain treatment.  * This Authorization becomes effective upon signing and will  on (date) __________.      If no date is indicated, this Authorization will  one (1) year from the signature date.    Name: Stas Gann    Signature:   Date:     10/19/2022        PLEASE FAX REQUESTED RECORDS BACK TO: [phone]

## 2022-10-19 NOTE — PROGRESS NOTES
FAMILY MEDICINE VISIT                                                               Chief complaint::Diagnoses of Type 2 diabetes mellitus without complication, with long-term current use of insulin ([facility]), Screening for AAA (abdominal aortic aneurysm), Primary hypertension, Prostate cancer ([facility]), and Need for vaccination were pertinent to this visit.    History of present illness: Stas Gann is a 72 y.o. male who presented for lab follow up.    Problem   Primary Osteoarthritis of Right Knee    He was bowling in the beginning of this month on 3/4/2022 and when he got up from position from bowling he noticed right knee pain.  Denies any pop, direct trauma to his knees.  Denies any fall.  Knee pain lasted for 2 days and then got better and next Friday he went again for bowling and started having knee pain.  He looked online and got ACL brace which helped a lot with symptoms.  He took ibuprofen which helped with symptoms.  He reports only mild knee pain now.  There is no redness or swelling of her knee.  He is currently doing physical therapy for his knee which is helping a lot with symptoms.     Prostate Cancer ([facility])    Was diagnosed with prostate cancer in 2018, received 28 treatments of radiation therapy.  PSA level came back in normal range.  Previously was following with urology, currently not following.        Type 2 Diabetes Mellitus Without Complication, With Long-Term Current Use of Insulin ([facility])    Last A1c came back at 5.8.  He is currently not on any medication.  He is working on diet and exercise    Lab Results   Component Value Date/Time    HBA1C 5.8 (A) 10/19/2022 01:31 PM        Up-to-date for foot exam and eye exam.     Htn (Hypertension)    Blood pressure today 122/62.  He is taking benazepril 40 mg daily and amlodipine 10 mg daily doing well with these medications.  No side effects with medication.  No chest pain, palpitations, shortness of breath, lower leg swelling.          Review of  "systems:     Review of Systems   Constitutional:  Negative for chills and fever.   Respiratory:  Negative for cough, shortness of breath and wheezing.    Cardiovascular:  Negative for chest pain, palpitations and leg swelling.      Medications and Allergies:     Current Outpatient Medications   Medication Sig Dispense Refill    amLODIPine (NORVASC) 10 MG Tab Take 1 Tablet by mouth every day. 90 Tablet 3    benazepril (LOTENSIN) 40 MG tablet Take 1 Tablet by mouth every day. 90 Tablet 3    atorvastatin (LIPITOR) 10 MG Tab TAKE 1 TABLET DAILY 90 Tablet 3    tadalafil (CIALIS) 5 MG tablet Take 1 Tablet by mouth every day. 90 Tablet 3    Multiple Vitamin (MULTI-VITAMINS) Tab Take  by mouth.       No current facility-administered medications for this visit.          Vitals:    /62 (BP Location: Left arm, Patient Position: Sitting, BP Cuff Size: Adult)   Pulse 72   Temp 36.6 °C (97.8 °F)   Resp 17   Ht 1.702 m (5' 7\")   Wt 124 kg (273 lb 5.9 oz)   SpO2 95%  Body mass index is 42.82 kg/m².    Physical Exam:     Physical Exam  Constitutional:       General: He is not in acute distress.  HENT:      Head: Normocephalic and atraumatic.      Right Ear: Tympanic membrane, ear canal and external ear normal.      Left Ear: Tympanic membrane, ear canal and external ear normal.   Eyes:      Conjunctiva/sclera: Conjunctivae normal.   Cardiovascular:      Rate and Rhythm: Normal rate and regular rhythm.      Heart sounds: Normal heart sounds. No murmur heard.    No friction rub. No gallop.   Pulmonary:      Effort: Pulmonary effort is normal. No respiratory distress.      Breath sounds: Normal breath sounds. No wheezing or rales.   Musculoskeletal:         General: No deformity.      Cervical back: Neck supple.   Neurological:      Mental Status: He is alert.      Gait: Gait is intact.   Psychiatric:         Mood and Affect: Mood and affect normal.         Judgment: Judgment normal.          Assessment/Plan:     "     Problem List Items Addressed This Visit       HTN (hypertension)     Chronic problem, stable, continue same medication regimen.         Relevant Orders    Lipid Profile    Prostate cancer (HCC)     Chronic problem, reviewed check PSA diagnostic with next blood work         Relevant Orders    PROSTATE SPECIFIC AG DIAGNOSTIC    Type 2 diabetes mellitus without complication, with long-term current use of insulin (HCC)     Chronic problem, improving and stable without medications.  Continue to eat healthy diet and exercise.  Recheck labs in 6-month.  Request eye exam records.         Relevant Orders    POCT  A1C (Completed)    Comp Metabolic Panel    HEMOGLOBIN A1C    MICROALBUMIN CREAT RATIO URINE     Other Visit Diagnoses       Screening for AAA (abdominal aortic aneurysm)        Relevant Orders    US-ABDOMINAL AORTA W/O DOPPLER    Need for vaccination      First dose of hep b given today, second in 1 to 2 month and in 6 months for third dose    Relevant Orders    Hep B Adult 20+ (Completed)             Please note that this dictation was created using voice recognition software. I have made every reasonable attempt to correct obvious errors, but I expect that there are errors of grammar and possibly content that I did not discover before finalizing the note.    Follow up in 6 months for lab follow-up, MA visit in 1 to 2 months for second dose of hep B

## 2022-11-04 ENCOUNTER — PATIENT MESSAGE (OUTPATIENT)
Dept: HEALTH INFORMATION MANAGEMENT | Facility: OTHER | Age: 72
End: 2022-11-04

## 2022-12-20 ENCOUNTER — NON-PROVIDER VISIT (OUTPATIENT)
Dept: MEDICAL GROUP | Facility: MEDICAL CENTER | Age: 72
End: 2022-12-20
Payer: MEDICARE

## 2022-12-20 DIAGNOSIS — Z23 NEED FOR VACCINATION: ICD-10-CM

## 2022-12-20 PROCEDURE — 90746 HEPB VACCINE 3 DOSE ADULT IM: CPT | Performed by: FAMILY MEDICINE

## 2022-12-20 PROCEDURE — G0010 ADMIN HEPATITIS B VACCINE: HCPCS | Performed by: FAMILY MEDICINE

## 2022-12-20 NOTE — PROGRESS NOTES
"Kaushik Gann is a 72 y.o. male here for a non-provider visit for:   HEPATITIS B 3 of 3    Reason for immunization: continue or complete series started at the office  Immunization records indicate need for vaccine: Yes, confirmed with Epic  Minimum interval has been met for this vaccine: Yes  ABN completed: Yes    VIS Dated  2021 was given to patient: Yes  All IAC Questionnaire questions were answered \"No.\"    Patient tolerated injection and no adverse effects were observed or reported: Yes    Pt scheduled for next dose in series: Not Indicated  "

## 2023-04-12 ENCOUNTER — HOSPITAL ENCOUNTER (OUTPATIENT)
Dept: LAB | Facility: MEDICAL CENTER | Age: 73
End: 2023-04-12
Attending: FAMILY MEDICINE
Payer: MEDICARE

## 2023-04-12 DIAGNOSIS — I10 PRIMARY HYPERTENSION: ICD-10-CM

## 2023-04-12 DIAGNOSIS — C61 PROSTATE CANCER (HCC): ICD-10-CM

## 2023-04-12 DIAGNOSIS — Z79.4 TYPE 2 DIABETES MELLITUS WITHOUT COMPLICATION, WITH LONG-TERM CURRENT USE OF INSULIN (HCC): ICD-10-CM

## 2023-04-12 DIAGNOSIS — E11.9 TYPE 2 DIABETES MELLITUS WITHOUT COMPLICATION, WITH LONG-TERM CURRENT USE OF INSULIN (HCC): ICD-10-CM

## 2023-04-12 LAB
ALBUMIN SERPL BCP-MCNC: 4 G/DL (ref 3.2–4.9)
ALBUMIN/GLOB SERPL: 1.8 G/DL
ALP SERPL-CCNC: 56 U/L (ref 30–99)
ALT SERPL-CCNC: 22 U/L (ref 2–50)
ANION GAP SERPL CALC-SCNC: 10 MMOL/L (ref 7–16)
AST SERPL-CCNC: 19 U/L (ref 12–45)
BILIRUB SERPL-MCNC: 0.6 MG/DL (ref 0.1–1.5)
BUN SERPL-MCNC: 18 MG/DL (ref 8–22)
CALCIUM ALBUM COR SERPL-MCNC: 9.2 MG/DL (ref 8.5–10.5)
CALCIUM SERPL-MCNC: 9.2 MG/DL (ref 8.5–10.5)
CHLORIDE SERPL-SCNC: 105 MMOL/L (ref 96–112)
CHOLEST SERPL-MCNC: 101 MG/DL (ref 100–199)
CO2 SERPL-SCNC: 25 MMOL/L (ref 20–33)
CREAT SERPL-MCNC: 1 MG/DL (ref 0.5–1.4)
EST. AVERAGE GLUCOSE BLD GHB EST-MCNC: 137 MG/DL
FASTING STATUS PATIENT QL REPORTED: NORMAL
GFR SERPLBLD CREATININE-BSD FMLA CKD-EPI: 78 ML/MIN/1.73 M 2
GLOBULIN SER CALC-MCNC: 2.2 G/DL (ref 1.9–3.5)
GLUCOSE SERPL-MCNC: 136 MG/DL (ref 65–99)
HBA1C MFR BLD: 6.4 % (ref 4–5.6)
HDLC SERPL-MCNC: 38 MG/DL
LDLC SERPL CALC-MCNC: 37 MG/DL
POTASSIUM SERPL-SCNC: 4.2 MMOL/L (ref 3.6–5.5)
PROT SERPL-MCNC: 6.2 G/DL (ref 6–8.2)
PSA SERPL-MCNC: 1.01 NG/ML (ref 0–4)
SODIUM SERPL-SCNC: 140 MMOL/L (ref 135–145)
TRIGL SERPL-MCNC: 128 MG/DL (ref 0–149)

## 2023-04-12 PROCEDURE — 82043 UR ALBUMIN QUANTITATIVE: CPT

## 2023-04-12 PROCEDURE — 84153 ASSAY OF PSA TOTAL: CPT

## 2023-04-12 PROCEDURE — 36415 COLL VENOUS BLD VENIPUNCTURE: CPT

## 2023-04-12 PROCEDURE — 80053 COMPREHEN METABOLIC PANEL: CPT

## 2023-04-12 PROCEDURE — 80061 LIPID PANEL: CPT

## 2023-04-12 PROCEDURE — 83036 HEMOGLOBIN GLYCOSYLATED A1C: CPT | Mod: GA

## 2023-04-12 PROCEDURE — 82570 ASSAY OF URINE CREATININE: CPT

## 2023-04-13 LAB
CREAT UR-MCNC: 134.5 MG/DL
MICROALBUMIN UR-MCNC: 1.6 MG/DL
MICROALBUMIN/CREAT UR: 12 MG/G (ref 0–30)

## 2023-04-19 ENCOUNTER — OFFICE VISIT (OUTPATIENT)
Dept: MEDICAL GROUP | Facility: MEDICAL CENTER | Age: 73
End: 2023-04-19
Payer: MEDICARE

## 2023-04-19 VITALS
OXYGEN SATURATION: 95 % | DIASTOLIC BLOOD PRESSURE: 70 MMHG | HEIGHT: 67 IN | TEMPERATURE: 97.4 F | RESPIRATION RATE: 18 BRPM | WEIGHT: 273.37 LBS | SYSTOLIC BLOOD PRESSURE: 136 MMHG | HEART RATE: 75 BPM | BODY MASS INDEX: 42.91 KG/M2

## 2023-04-19 DIAGNOSIS — E66.01 MORBID (SEVERE) OBESITY DUE TO EXCESS CALORIES (HCC): ICD-10-CM

## 2023-04-19 DIAGNOSIS — Z23 NEED FOR VACCINATION: ICD-10-CM

## 2023-04-19 DIAGNOSIS — E78.2 ELEVATED TRIGLYCERIDES WITH HIGH CHOLESTEROL: ICD-10-CM

## 2023-04-19 DIAGNOSIS — E11.9 TYPE 2 DIABETES MELLITUS WITHOUT COMPLICATION, WITH LONG-TERM CURRENT USE OF INSULIN (HCC): ICD-10-CM

## 2023-04-19 DIAGNOSIS — Z79.4 TYPE 2 DIABETES MELLITUS WITHOUT COMPLICATION, WITH LONG-TERM CURRENT USE OF INSULIN (HCC): ICD-10-CM

## 2023-04-19 DIAGNOSIS — M17.11 PRIMARY OSTEOARTHRITIS OF RIGHT KNEE: ICD-10-CM

## 2023-04-19 DIAGNOSIS — I10 PRIMARY HYPERTENSION: ICD-10-CM

## 2023-04-19 PROCEDURE — G0010 ADMIN HEPATITIS B VACCINE: HCPCS | Performed by: FAMILY MEDICINE

## 2023-04-19 PROCEDURE — 99214 OFFICE O/P EST MOD 30 MIN: CPT | Mod: 25 | Performed by: FAMILY MEDICINE

## 2023-04-19 PROCEDURE — 90746 HEPB VACCINE 3 DOSE ADULT IM: CPT | Performed by: FAMILY MEDICINE

## 2023-04-19 RX ORDER — ORAL SEMAGLUTIDE 3 MG/1
3 TABLET ORAL DAILY
Qty: 90 TABLET | Refills: 3 | Status: SHIPPED | OUTPATIENT
Start: 2023-04-19 | End: 2023-04-19 | Stop reason: SDUPTHER

## 2023-04-19 RX ORDER — ORAL SEMAGLUTIDE 3 MG/1
3 TABLET ORAL DAILY
Qty: 90 TABLET | Refills: 0 | Status: SHIPPED | OUTPATIENT
Start: 2023-04-19 | End: 2023-05-25

## 2023-04-19 ASSESSMENT — ENCOUNTER SYMPTOMS
FEVER: 0
CHILLS: 0
PALPITATIONS: 0

## 2023-04-19 ASSESSMENT — PATIENT HEALTH QUESTIONNAIRE - PHQ9: CLINICAL INTERPRETATION OF PHQ2 SCORE: 0

## 2023-04-19 NOTE — ASSESSMENT & PLAN NOTE
Chronic problem, unstable, counseled regarding eating healthy diet and exercise.  Start Rybelsus for diabetes and for weight loss

## 2023-04-19 NOTE — ASSESSMENT & PLAN NOTE
Chronic problem, unstable, A1c got elevated from 5.8-6.4.  His current BMI is at 42.82.  Start Rybelsus 3 mg daily.  Continue to work on diet and exercise for weight loss

## 2023-04-19 NOTE — PROGRESS NOTES
FAMILY MEDICINE VISIT                                                               Chief complaint::Diagnoses of Primary hypertension, Type 2 diabetes mellitus without complication, with long-term current use of insulin (HCC), Elevated triglycerides with high cholesterol, Morbid (severe) obesity due to excess calories (HCC), Body mass index (BMI) 40.0-44.9, adult (HCC), Need for vaccination, and Primary osteoarthritis of right knee were pertinent to this visit.    History of present illness: Stas Gann is a 73 y.o. male who presented for lab follow up.    Problem   Body Mass Index (Bmi) 40.0-44.9, Adult (Hcc)   Primary Osteoarthritis of Right Knee    Last x-ray knee showed minimal arthritis in right knee.  He reports his knee was doing better until 10 days ago he started having pain again but he does bowling and reports that he may have placed a lot of pressure on that knee     Elevated Triglycerides With High Cholesterol    Recent lipid panel showed cholesterol levels in normal range, HDL mildly low at 38.  He is on atorvastatin 10 mg daily.    Lab Results   Component Value Date/Time    CHOLSTRLTOT 101 04/12/2023 0849    TRIGLYCERIDE 128 04/12/2023 0849    HDL 38 (A) 04/12/2023 0849    LDL 37 04/12/2023 0849    CHOLHDLRAT 4.0 07/30/2009 1113        Morbid (severe) obesity due to excess calories (HCC)    Current BMI at 42.82.  Working on improving his diet.  Reports that now weather is getting better, has been more physically active.     Type 2 Diabetes Mellitus Without Complication, With Long-Term Current Use of Insulin (Hcc)    Recent A1c came back at 6.4 which increased from previous numbers.  He is currently not on any medication.  Current BMI is at 42.82    Lab Results   Component Value Date/Time    HBA1C 6.4 (H) 04/12/2023 08:49 AM      Monofilament testing with a 10 gram force: sensation intact: intact bilaterally  Visual Inspection: Feet without maceration, ulcers, fissures.  Pedal pulses: intact  "bilaterally     Htn (Hypertension)    Blood pressure today 136/70.  He is taking benazepril 40 mg daily and amlodipine 10 mg daily doing well with these medications.  No side effects with medication.  No chest pain, palpitations, shortness of breath, lower leg swelling.            Review of systems:     Review of Systems   Constitutional:  Negative for chills, fever and malaise/fatigue.   Cardiovascular:  Negative for chest pain, palpitations and leg swelling.   Musculoskeletal:         Right knee pain      Medications and Allergies:     Current Outpatient Medications   Medication Sig Dispense Refill    Semaglutide (RYBELSUS) 3 MG Tab Take 3 mg by mouth every day. 90 Tablet 0    amLODIPine (NORVASC) 10 MG Tab Take 1 Tablet by mouth every day. 90 Tablet 3    benazepril (LOTENSIN) 40 MG tablet Take 1 Tablet by mouth every day. 90 Tablet 3    atorvastatin (LIPITOR) 10 MG Tab TAKE 1 TABLET DAILY 90 Tablet 3    tadalafil (CIALIS) 5 MG tablet Take 1 Tablet by mouth every day. 90 Tablet 3     No current facility-administered medications for this visit.          Vitals:    /70   Pulse 75   Temp 36.3 °C (97.4 °F)   Resp 18   Ht 1.702 m (5' 7\")   Wt 124 kg (273 lb 5.9 oz)   SpO2 95%  Body mass index is 42.82 kg/m².    Physical Exam:     Physical Exam  Constitutional:       Appearance: Normal appearance. He is well-developed and well-groomed.   HENT:      Head: Normocephalic and atraumatic.      Right Ear: External ear normal.      Left Ear: External ear normal.   Eyes:      General:         Right eye: No discharge.         Left eye: No discharge.      Conjunctiva/sclera: Conjunctivae normal.   Cardiovascular:      Rate and Rhythm: Normal rate.   Pulmonary:      Effort: Pulmonary effort is normal. No respiratory distress.   Musculoskeletal:      Cervical back: Neck supple.   Skin:     Findings: No rash.   Neurological:      Mental Status: He is alert.   Psychiatric:         Mood and Affect: Mood and affect normal.  "        Behavior: Behavior normal.        Labs:  I reviewed with patient recent labs resulted on 4/14/2023.    Assessment/Plan:    HCC Gap Form    Diagnosis: E66.01 - Morbid (severe) obesity due to excess calories (HCC)  Z68.41 - Body mass index (BMI) 40.0-44.9, adult ([facility])  The current BMI is 42.82 kg/m2 as of 04/19/23 13:36 PDT  Assessment and plan: Chronic, worsening. Encouraged healthy diet and physical activity changes with a goal of weight loss. Follow up at least annually. Start Rybelsus 3 mg for diabetes and for weight loss  Diagnosis to address: E11.9, Z79.4 - Type 2 diabetes mellitus without complication, with long-term current use of insulin (HCC)  Assessment and plan: Chronic, stable. Continue with current defined treatment plan: Start Rybelsus 3 mg. Follow-up at least annually.  Last edited 04/19/23 13:36 PDT by Ronnell Marcelo M.D.          Problem List Items Addressed This Visit       Type 2 diabetes mellitus without complication, with long-term current use of insulin ([facility])     Chronic problem, unstable, A1c got elevated from 5.8-6.4.  His current BMI is at 42.82.  Start Rybelsus 3 mg daily.  Continue to work on diet and exercise for weight loss         Relevant Medications    Semaglutide (RYBELSUS) 3 MG Tab    Other Relevant Orders    Diabetic Monofilament Lower Extremity Exam (Completed)    Comp Metabolic Panel    HEMOGLOBIN A1C    Primary osteoarthritis of right knee     Chronic problem, unstable, we discussed about physical therapy.  He reports that he will like to wait and see if this get better on its own.  Recommended exercises at home.  If not getting better, we discussed about referring to Ortho regarding this.         Morbid (severe) obesity due to excess calories (HCC)     Chronic problem, unstable, counseled regarding eating healthy diet and exercise.  Start Rybelsus for diabetes and for weight loss         Relevant Medications    Semaglutide (RYBELSUS) 3 MG Tab    HTN (hypertension)      Chronic problem, stable, continue benazepril 40 mg daily and amlodipine 10 mg daily.         Elevated triglycerides with high cholesterol     Chronic problem, stable, continue Lipitor 10 mg daily.  Recheck labs in 1 year         Body mass index (BMI) 40.0-44.9, adult (HCC)    Relevant Medications    Semaglutide (RYBELSUS) 3 MG Tab     Other Visit Diagnoses       Need for vaccination        Relevant Orders    Hep B Adult 20+ (Completed)             Please note that this dictation was created using voice recognition software. I have made every reasonable attempt to correct obvious errors, but I expect that there are errors of grammar and possibly content that I did not discover before finalizing the note.    Follow up in 3 months for lab follow up.

## 2023-05-16 DIAGNOSIS — E78.00 ELEVATED LDL CHOLESTEROL LEVEL: ICD-10-CM

## 2023-05-16 RX ORDER — ATORVASTATIN CALCIUM 10 MG/1
TABLET, FILM COATED ORAL
Qty: 90 TABLET | Refills: 3 | Status: SHIPPED | OUTPATIENT
Start: 2023-05-16

## 2023-05-25 ENCOUNTER — OFFICE VISIT (OUTPATIENT)
Dept: MEDICAL GROUP | Facility: MEDICAL CENTER | Age: 73
End: 2023-05-25
Payer: MEDICARE

## 2023-05-25 VITALS
TEMPERATURE: 97.5 F | RESPIRATION RATE: 16 BRPM | HEIGHT: 67 IN | HEART RATE: 70 BPM | DIASTOLIC BLOOD PRESSURE: 66 MMHG | OXYGEN SATURATION: 95 % | BODY MASS INDEX: 42.91 KG/M2 | WEIGHT: 273.37 LBS | SYSTOLIC BLOOD PRESSURE: 130 MMHG

## 2023-05-25 DIAGNOSIS — M79.662 PAIN AND SWELLING OF LEFT LOWER LEG: ICD-10-CM

## 2023-05-25 DIAGNOSIS — E11.69 TYPE 2 DIABETES MELLITUS WITH OTHER SPECIFIED COMPLICATION, WITHOUT LONG-TERM CURRENT USE OF INSULIN (HCC): ICD-10-CM

## 2023-05-25 DIAGNOSIS — M79.89 PAIN AND SWELLING OF LEFT LOWER LEG: ICD-10-CM

## 2023-05-25 PROCEDURE — 3075F SYST BP GE 130 - 139MM HG: CPT | Performed by: FAMILY MEDICINE

## 2023-05-25 PROCEDURE — 3078F DIAST BP <80 MM HG: CPT | Performed by: FAMILY MEDICINE

## 2023-05-25 PROCEDURE — 99214 OFFICE O/P EST MOD 30 MIN: CPT | Performed by: FAMILY MEDICINE

## 2023-05-25 RX ORDER — FUROSEMIDE 20 MG/1
20 TABLET ORAL 2 TIMES DAILY
Qty: 14 TABLET | Refills: 0 | Status: SHIPPED | OUTPATIENT
Start: 2023-05-25 | End: 2023-06-01

## 2023-05-25 ASSESSMENT — ENCOUNTER SYMPTOMS
CHILLS: 0
FEVER: 0

## 2023-05-25 NOTE — PROGRESS NOTES
FAMILY MEDICINE VISIT                                                               Chief complaint::Diagnoses of Type 2 diabetes mellitus with other specified complication, without long-term current use of insulin (HCC) and Pain and swelling of left lower leg were pertinent to this visit.    History of present illness: Stas Gann is a 73 y.o. male who presented for pain and swelling of left lower leg.    He reports that 2 weeks ago, he was bowling and after that he started having pain and swelling in his left lower leg.  He has tried medication which he had at home which was a diuretic but he reports that it was not effective.  He is having pain at the leg also.  He denies any recent long travel.  He thought that he was likely stuck by bees so he took antihistaminic which also did not help with symptoms.    He has history of diabetes and we started on Rybelsus at last visit.  Rybelsus is not covered by his insurance        Review of systems:     Review of Systems   Constitutional:  Negative for chills, fever and malaise/fatigue.   Musculoskeletal:         Left lower leg swelling and pain        Medications and Allergies:     Current Outpatient Medications   Medication Sig Dispense Refill    metFORMIN (GLUCOPHAGE) 500 MG Tab Take 1 Tablet by mouth 2 times a day with meals. 180 Tablet 3    Empagliflozin 25 MG Tab Take 1 Tablet by mouth every day. 90 Tablet 3    furosemide (LASIX) 20 MG Tab Take 1 Tablet by mouth 2 times a day for 7 days. 14 Tablet 0    atorvastatin (LIPITOR) 10 MG Tab TAKE 1 TABLET DAILY 90 Tablet 3    benazepril (LOTENSIN) 40 MG tablet Take 1 Tablet by mouth every day. 90 Tablet 3    tadalafil (CIALIS) 5 MG tablet Take 1 Tablet by mouth every day. 90 Tablet 3    amLODIPine (NORVASC) 10 MG Tab Take 1 Tablet by mouth every day. 90 Tablet 3     No current facility-administered medications for this visit.          Vitals:    /66   Pulse 70   Temp 36.4 °C (97.5 °F)   Resp 16   Ht  "1.702 m (5' 7\")   Wt 124 kg (273 lb 5.9 oz)   SpO2 95%  Body mass index is 42.82 kg/m².    Physical Exam:     Physical Exam  Constitutional:       Appearance: Normal appearance. He is well-developed and well-groomed.   HENT:      Head: Normocephalic and atraumatic.      Right Ear: External ear normal.      Left Ear: External ear normal.   Eyes:      General:         Right eye: No discharge.         Left eye: No discharge.      Conjunctiva/sclera: Conjunctivae normal.   Cardiovascular:      Rate and Rhythm: Normal rate.   Pulmonary:      Effort: Pulmonary effort is normal. No respiratory distress.   Musculoskeletal:      Cervical back: Neck supple.      Left lower leg: Edema present.      Comments: There is 2+ pitting edema at his left foot and 1+ pitting edema at his left lower leg.  There is tenderness on palpation at left leg.  There is diffuse mild erythema also.   Skin:     Findings: No rash.   Neurological:      Mental Status: He is alert.   Psychiatric:         Mood and Affect: Mood and affect normal.         Behavior: Behavior normal.              Assessment/Plan:         1. Type 2 diabetes mellitus with other specified complication, without long-term current use of insulin (HCC)  Chronic problem, unstable, start metformin 1 tablet 2 times daily and Jardiance 25 mg daily.  - metFORMIN (GLUCOPHAGE) 500 MG Tab; Take 1 Tablet by mouth 2 times a day with meals.  Dispense: 180 Tablet; Refill: 3  - Empagliflozin 25 MG Tab; Take 1 Tablet by mouth every day.  Dispense: 90 Tablet; Refill: 3    2. Pain and swelling of left lower leg  New problem, unstable, check ultrasound venous to rule out DVT.  Order this ultrasound as stat.  Discussed that if this ultrasound is done after hours then he was found to have blood clot  then I recommend him to go to ER for medication evaluation.  He reports understanding.  If ultrasound venous comes back negative for any blood clot then start Lasix 1 tablet 2 times daily for 7 days.  " We will see him back in 1 week.    - US-EXTREMITY VENOUS LOWER UNILAT LEFT; Future  - furosemide (LASIX) 20 MG Tab; Take 1 Tablet by mouth 2 times a day for 7 days.  Dispense: 14 Tablet; Refill: 0     Please note that this dictation was created using voice recognition software. I have made every reasonable attempt to correct obvious errors, but I expect that there are errors of grammar and possibly content that I did not discover before finalizing the note.    Follow up in 1 week for follow-up.

## 2023-05-26 ENCOUNTER — HOSPITAL ENCOUNTER (OUTPATIENT)
Dept: RADIOLOGY | Facility: MEDICAL CENTER | Age: 73
End: 2023-05-26
Attending: FAMILY MEDICINE
Payer: MEDICARE

## 2023-05-26 DIAGNOSIS — M79.89 PAIN AND SWELLING OF LEFT LOWER LEG: ICD-10-CM

## 2023-05-26 DIAGNOSIS — M79.662 PAIN AND SWELLING OF LEFT LOWER LEG: ICD-10-CM

## 2023-05-26 PROCEDURE — 93971 EXTREMITY STUDY: CPT | Mod: LT

## 2023-06-01 ENCOUNTER — OFFICE VISIT (OUTPATIENT)
Dept: MEDICAL GROUP | Facility: MEDICAL CENTER | Age: 73
End: 2023-06-01
Payer: MEDICARE

## 2023-06-01 VITALS
TEMPERATURE: 97.6 F | RESPIRATION RATE: 17 BRPM | BODY MASS INDEX: 42.85 KG/M2 | HEART RATE: 71 BPM | DIASTOLIC BLOOD PRESSURE: 62 MMHG | HEIGHT: 67 IN | SYSTOLIC BLOOD PRESSURE: 126 MMHG | OXYGEN SATURATION: 96 % | WEIGHT: 273 LBS

## 2023-06-01 DIAGNOSIS — I10 PRIMARY HYPERTENSION: ICD-10-CM

## 2023-06-01 DIAGNOSIS — E11.69 TYPE 2 DIABETES MELLITUS WITH OTHER SPECIFIED COMPLICATION, WITHOUT LONG-TERM CURRENT USE OF INSULIN (HCC): ICD-10-CM

## 2023-06-01 DIAGNOSIS — M79.89 LEG SWELLING: ICD-10-CM

## 2023-06-01 PROCEDURE — 99214 OFFICE O/P EST MOD 30 MIN: CPT | Performed by: FAMILY MEDICINE

## 2023-06-01 PROCEDURE — 3074F SYST BP LT 130 MM HG: CPT | Performed by: FAMILY MEDICINE

## 2023-06-01 PROCEDURE — 3078F DIAST BP <80 MM HG: CPT | Performed by: FAMILY MEDICINE

## 2023-06-01 ASSESSMENT — ENCOUNTER SYMPTOMS
FEVER: 0
CHILLS: 0
PALPITATIONS: 0

## 2023-06-01 NOTE — ASSESSMENT & PLAN NOTE
Chronic problem, stable, continue metformin 500 2 times daily and Jardiance 10 mg daily.  He is experiencing dizziness and lightheadedness symptoms which is likely due to diuretic Lasix.  Recommended to monitor symptoms closely and follow-up in office approximately if not getting better

## 2023-06-01 NOTE — ASSESSMENT & PLAN NOTE
Chronic problem, stable, recommended to stop Lasix as the swelling is significantly better now.  Monitor symptoms closely.  Drink more water to maintain hydration to prevent lightheadedness symptoms

## 2023-06-01 NOTE — PROGRESS NOTES
FAMILY MEDICINE VISIT                                                               Chief complaint::Diagnoses of Type 2 diabetes mellitus with other specified complication, without long-term current use of insulin (HCC), Primary hypertension, and Leg swelling were pertinent to this visit.    History of present illness: Stas Gann is a 73 y.o. male who presented for ultrasound follow-up.    Problem   Leg Swelling    He was having left leg swelling, we checked ultrasound venous which came back negative for any blood clot.  I started him on Lasix which he took and that helped significantly with swelling.  He does not have any swelling now.  He reports that he is experiencing lightheadedness symptoms.       Type 2 Diabetes Mellitus With Other Specified Complication (Hcc)    Recent A1c came back at 6.4 which increased from previous numbers.  We started him on metformin 500 mg 2 times daily and Jardiance 25 mg daily    Lab Results   Component Value Date/Time    HBA1C 6.4 (H) 04/12/2023 08:49 AM         Htn (Hypertension)    Blood pressure today 126/62..  He is taking benazepril 40 mg daily and amlodipine 10 mg daily doing well with these medications.  No side effects with medication.  No chest pain, palpitations, shortness of breath, lower leg swelling.              Review of systems:     Review of Systems   Constitutional:  Negative for chills and fever.   Cardiovascular:  Negative for chest pain, palpitations and leg swelling.   Neurological:         Lightheadedness        Medications and Allergies:     Current Outpatient Medications   Medication Sig Dispense Refill    metFORMIN (GLUCOPHAGE) 500 MG Tab Take 1 Tablet by mouth 2 times a day with meals. 180 Tablet 3    Empagliflozin 25 MG Tab Take 1 Tablet by mouth every day. 90 Tablet 3    atorvastatin (LIPITOR) 10 MG Tab TAKE 1 TABLET DAILY 90 Tablet 3    amLODIPine (NORVASC) 10 MG Tab Take 1 Tablet by mouth every day. 90 Tablet 3    benazepril (LOTENSIN) 40  "MG tablet Take 1 Tablet by mouth every day. 90 Tablet 3    tadalafil (CIALIS) 5 MG tablet Take 1 Tablet by mouth every day. 90 Tablet 3     No current facility-administered medications for this visit.          Vitals:    /62   Pulse 71   Temp 36.4 °C (97.6 °F)   Resp 17   Ht 1.702 m (5' 7\")   Wt 124 kg (273 lb)   SpO2 96%  Body mass index is 42.76 kg/m².    Physical Exam:     Physical Exam  Constitutional:       Appearance: Normal appearance. He is well-developed and well-groomed.   HENT:      Head: Normocephalic and atraumatic.      Right Ear: External ear normal.      Left Ear: External ear normal.   Eyes:      General:         Right eye: No discharge.         Left eye: No discharge.      Conjunctiva/sclera: Conjunctivae normal.   Cardiovascular:      Rate and Rhythm: Normal rate.   Pulmonary:      Effort: Pulmonary effort is normal. No respiratory distress.   Musculoskeletal:      Cervical back: Neck supple.   Skin:     Findings: No rash.   Neurological:      Mental Status: He is alert.   Psychiatric:         Mood and Affect: Mood and affect normal.         Behavior: Behavior normal.            US-EXTREMITY VENOUS LOWER UNILAT LEFT  Narrative: 5/26/2023 4:39 PM    HISTORY/REASON FOR EXAM:  Swelling of Limb; R/o DVT. Please page on call provider for after hours    TECHNIQUE/EXAM DESCRIPTION:  Using both color and pulsed-wave Doppler imaging, multiple images were obtained of the left lower extremity from the common femoral vein origin distally through the popliteal trifurcation.  Graded compression was used to demonstrate a patent lumen.    COMPARISON:  None.    FINDINGS:    REAL-TIME GRAY-SCALE IMAGING:  Real-time gray-scale imaging reveals no evidence of focal wall thickening.    COLOR AND DUPLEX DOPPLER IMAGING:  There is no evidence of luminal thrombus.  There is normal augmentation to flow and respiratory variation.  Impression: No evidence of left lower extremity deep venous thrombosis.   "     Assessment/Plan:         Problem List Items Addressed This Visit       Type 2 diabetes mellitus with other specified complication (HCC)     Chronic problem, stable, continue metformin 500 2 times daily and Jardiance 10 mg daily.  He is experiencing dizziness and lightheadedness symptoms which is likely due to diuretic Lasix.  Recommended to monitor symptoms closely and follow-up in office approximately if not getting better           Leg swelling     Chronic problem, stable, recommended to stop Lasix as the swelling is significantly better now.  Monitor symptoms closely.  Drink more water to maintain hydration to prevent lightheadedness symptoms           HTN (hypertension)     Chronic problem, stable, continue benazepril 40 mg daily and amlodipine 10 mg daily.               Please note that this dictation was created using voice recognition software. I have made every reasonable attempt to correct obvious errors, but I expect that there are errors of grammar and possibly content that I did not discover before finalizing the note.    Follow up in 7/19/2023 for lab follow up.

## 2023-07-13 ENCOUNTER — HOSPITAL ENCOUNTER (OUTPATIENT)
Dept: LAB | Facility: MEDICAL CENTER | Age: 73
End: 2023-07-13
Attending: FAMILY MEDICINE
Payer: MEDICARE

## 2023-07-13 DIAGNOSIS — Z79.4 TYPE 2 DIABETES MELLITUS WITHOUT COMPLICATION, WITH LONG-TERM CURRENT USE OF INSULIN (HCC): ICD-10-CM

## 2023-07-13 DIAGNOSIS — E11.9 TYPE 2 DIABETES MELLITUS WITHOUT COMPLICATION, WITH LONG-TERM CURRENT USE OF INSULIN (HCC): ICD-10-CM

## 2023-07-13 LAB
ALBUMIN SERPL BCP-MCNC: 4 G/DL (ref 3.2–4.9)
ALBUMIN/GLOB SERPL: 1.9 G/DL
ALP SERPL-CCNC: 60 U/L (ref 30–99)
ALT SERPL-CCNC: 19 U/L (ref 2–50)
ANION GAP SERPL CALC-SCNC: 12 MMOL/L (ref 7–16)
AST SERPL-CCNC: 19 U/L (ref 12–45)
BILIRUB SERPL-MCNC: 0.5 MG/DL (ref 0.1–1.5)
BUN SERPL-MCNC: 17 MG/DL (ref 8–22)
CALCIUM ALBUM COR SERPL-MCNC: 8.9 MG/DL (ref 8.5–10.5)
CALCIUM SERPL-MCNC: 8.9 MG/DL (ref 8.5–10.5)
CHLORIDE SERPL-SCNC: 106 MMOL/L (ref 96–112)
CO2 SERPL-SCNC: 24 MMOL/L (ref 20–33)
CREAT SERPL-MCNC: 0.98 MG/DL (ref 0.5–1.4)
EST. AVERAGE GLUCOSE BLD GHB EST-MCNC: 128 MG/DL
GFR SERPLBLD CREATININE-BSD FMLA CKD-EPI: 80 ML/MIN/1.73 M 2
GLOBULIN SER CALC-MCNC: 2.1 G/DL (ref 1.9–3.5)
GLUCOSE SERPL-MCNC: 118 MG/DL (ref 65–99)
HBA1C MFR BLD: 6.1 % (ref 4–5.6)
POTASSIUM SERPL-SCNC: 4.3 MMOL/L (ref 3.6–5.5)
PROT SERPL-MCNC: 6.1 G/DL (ref 6–8.2)
SODIUM SERPL-SCNC: 142 MMOL/L (ref 135–145)

## 2023-07-13 PROCEDURE — 80053 COMPREHEN METABOLIC PANEL: CPT

## 2023-07-13 PROCEDURE — 36415 COLL VENOUS BLD VENIPUNCTURE: CPT | Mod: GA

## 2023-07-13 PROCEDURE — 83036 HEMOGLOBIN GLYCOSYLATED A1C: CPT | Mod: GA

## 2023-07-19 ENCOUNTER — OFFICE VISIT (OUTPATIENT)
Dept: MEDICAL GROUP | Facility: MEDICAL CENTER | Age: 73
End: 2023-07-19
Payer: MEDICARE

## 2023-07-19 VITALS
RESPIRATION RATE: 17 BRPM | WEIGHT: 264.55 LBS | DIASTOLIC BLOOD PRESSURE: 70 MMHG | OXYGEN SATURATION: 96 % | TEMPERATURE: 97.7 F | BODY MASS INDEX: 41.52 KG/M2 | HEART RATE: 66 BPM | SYSTOLIC BLOOD PRESSURE: 128 MMHG | HEIGHT: 67 IN

## 2023-07-19 DIAGNOSIS — E11.69 TYPE 2 DIABETES MELLITUS WITH OTHER SPECIFIED COMPLICATION, WITHOUT LONG-TERM CURRENT USE OF INSULIN (HCC): ICD-10-CM

## 2023-07-19 DIAGNOSIS — I10 PRIMARY HYPERTENSION: ICD-10-CM

## 2023-07-19 DIAGNOSIS — E78.2 ELEVATED TRIGLYCERIDES WITH HIGH CHOLESTEROL: ICD-10-CM

## 2023-07-19 DIAGNOSIS — I10 ESSENTIAL HYPERTENSION: ICD-10-CM

## 2023-07-19 PROCEDURE — 3078F DIAST BP <80 MM HG: CPT | Performed by: FAMILY MEDICINE

## 2023-07-19 PROCEDURE — 3074F SYST BP LT 130 MM HG: CPT | Performed by: FAMILY MEDICINE

## 2023-07-19 PROCEDURE — 99214 OFFICE O/P EST MOD 30 MIN: CPT | Performed by: FAMILY MEDICINE

## 2023-07-19 RX ORDER — AMLODIPINE BESYLATE 10 MG/1
TABLET ORAL
Qty: 90 TABLET | Refills: 3 | Status: SHIPPED | OUTPATIENT
Start: 2023-07-19

## 2023-07-19 RX ORDER — BENAZEPRIL HYDROCHLORIDE 40 MG/1
TABLET ORAL
Qty: 90 TABLET | Refills: 3 | Status: SHIPPED | OUTPATIENT
Start: 2023-07-19

## 2023-07-19 ASSESSMENT — ENCOUNTER SYMPTOMS
CHILLS: 0
FEVER: 0
PALPITATIONS: 0

## 2023-07-19 NOTE — ASSESSMENT & PLAN NOTE
Chronic problem, stable, continue metformin 500 mg 2 times daily, request eye exam records.  Recheck labs in 6 months.

## 2023-07-19 NOTE — PROGRESS NOTES
FAMILY MEDICINE VISIT                                                               Chief complaint::Diagnoses of Type 2 diabetes mellitus with other specified complication, without long-term current use of insulin (HCC), Primary hypertension, and Elevated triglycerides with high cholesterol were pertinent to this visit.    History of present illness: Stas Gann is a 73 y.o. male who presented for lab follow-up.    Problem   Elevated Triglycerides With High Cholesterol    Recent lipid panel showed cholesterol levels in normal range, HDL mildly low at 38.  He is on atorvastatin 10 mg daily.      Lab Results   Component Value Date/Time    CHOLSTRLTOT 101 04/12/2023 0849    TRIGLYCERIDE 128 04/12/2023 0849    HDL 38 (A) 04/12/2023 0849    LDL 37 04/12/2023 0849    CHOLHDLRAT 4.0 07/30/2009 1113        Type 2 Diabetes Mellitus With Other Specified Complication (Hcc)    Recent A1c came back at 6.1 which improved from previous numbers.  He is taking metformin 500 mg 2 times daily.  He is not taking Jardiance.  Eye exam done by ophthalmology  Lab Results   Component Value Date/Time    HBA1C 6.1 (H) 07/13/2023 08:06 AM         Htn (Hypertension)    Blood pressure today 126/62..  He is taking benazepril 40 mg daily and amlodipine 10 mg daily doing well with these medications.  No side effects with medication.  No chest pain, palpitations, shortness of breath, lower leg swelling.            Review of systems:     Review of Systems   Constitutional:  Negative for chills, fever and malaise/fatigue.   Cardiovascular:  Negative for chest pain, palpitations and leg swelling.        Medications and Allergies:     Current Outpatient Medications   Medication Sig Dispense Refill    benazepril (LOTENSIN) 40 MG tablet TAKE 1 TABLET DAILY 90 Tablet 3    amLODIPine (NORVASC) 10 MG Tab TAKE 1 TABLET DAILY 90 Tablet 3    metFORMIN (GLUCOPHAGE) 500 MG Tab Take 1 Tablet by mouth 2 times a day with meals. 180 Tablet 3    atorvastatin  "(LIPITOR) 10 MG Tab TAKE 1 TABLET DAILY 90 Tablet 3    tadalafil (CIALIS) 5 MG tablet Take 1 Tablet by mouth every day. 90 Tablet 3     No current facility-administered medications for this visit.          Vitals:    /70   Pulse 66   Temp 36.5 °C (97.7 °F)   Resp 17   Ht 1.702 m (5' 7\")   Wt 120 kg (264 lb 8.8 oz)   SpO2 96%  Body mass index is 41.43 kg/m².    Physical Exam:     Physical Exam  Constitutional:       Appearance: Normal appearance. He is well-developed and well-groomed.   HENT:      Head: Normocephalic and atraumatic.      Right Ear: External ear normal.      Left Ear: External ear normal.   Eyes:      General:         Right eye: No discharge.         Left eye: No discharge.      Conjunctiva/sclera: Conjunctivae normal.   Cardiovascular:      Rate and Rhythm: Normal rate.   Pulmonary:      Effort: Pulmonary effort is normal. No respiratory distress.   Musculoskeletal:      Cervical back: Neck supple.   Skin:     Findings: No rash.   Neurological:      Mental Status: He is alert.   Psychiatric:         Mood and Affect: Mood and affect normal.         Behavior: Behavior normal.          Labs:  I reviewed with patient recent labs resulted on 7/13/2023    Assessment/Plan:         Problem List Items Addressed This Visit       Type 2 diabetes mellitus with other specified complication (HCC)     Chronic problem, stable, continue metformin 500 mg 2 times daily, request eye exam records.  Recheck labs in 6 months.         Relevant Orders    Comp Metabolic Panel    HEMOGLOBIN A1C    MICROALBUMIN CREAT RATIO URINE    VITAMIN B12    HTN (hypertension)     Chronic problem, stable, continue same medications as listed in HPI.         Elevated triglycerides with high cholesterol     Chronic problem, stable, continue atorvastatin 10 mg daily.  Recheck labs with next blood test.         Relevant Orders    Lipid Profile        Please note that this dictation was created using voice recognition software. I " have made every reasonable attempt to correct obvious errors, but I expect that there are errors of grammar and possibly content that I did not discover before finalizing the note.    Follow up in 6 months for annual physical, lab follow-up.

## 2023-07-19 NOTE — LETTER
[employer]  Ronnell Marcelo M.D.  [address]  [place] [address]  Fax: [phone]   Authorization for Release/Disclosure of   Protected Health Information   Name: STAS GANN : 1950 SSN: [id number]   Address: [address] [place] [address]  [place] [address] Phone:    [phone] (home)    I authorize the entity listed below to release/disclose the PHI below to:   [facility]/Ronnell Marcelo M.D. and Ronnell Marcelo M.D.   Provider or Entity Name:     Address   City, State, Zip   Phone:      Fax:     Reason for request: continuity of care   Information to be released:    [  ] LAST COLONOSCOPY,  including any PATH REPORT and follow-up  [  ] LAST FIT/COLOGUARD RESULT [  ] LAST DEXA  [  ] LAST MAMMOGRAM  [  ] LAST PAP  [  ] LAST LABS [  ] RETINA EXAM REPORT  [  ] IMMUNIZATION RECORDS  [  ] Release all info      [  ] Check here and initial the line next to each item to release ALL health information INCLUDING  _____ Care and treatment for drug and / or alcohol abuse  _____ HIV testing, infection status, or AIDS  _____ Genetic Testing    DATES OF SERVICE OR TIME PERIOD TO BE DISCLOSED: _____________  I understand and acknowledge that:  * This Authorization may be revoked at any time by you in writing, except if your health information has already been used or disclosed.  * Your health information that will be used or disclosed as a result of you signing this authorization could be re-disclosed by the recipient. If this occurs, your re-disclosed health information may no longer be protected by State or Federal laws.  * You may refuse to sign this Authorization. Your refusal will not affect your ability to obtain treatment.  * This Authorization becomes effective upon signing and will  on (date) __________.      If no date is indicated, this Authorization will  one (1) year from the signature date.    Name: Stas Gann  Signature: Date:   2023     PLEASE FAX  REQUESTED RECORDS BACK TO: [phone]

## 2023-11-29 ENCOUNTER — PATIENT MESSAGE (OUTPATIENT)
Dept: HEALTH INFORMATION MANAGEMENT | Facility: OTHER | Age: 73
End: 2023-11-29

## 2024-01-09 ENCOUNTER — HOSPITAL ENCOUNTER (OUTPATIENT)
Dept: LAB | Facility: MEDICAL CENTER | Age: 74
End: 2024-01-09
Attending: FAMILY MEDICINE
Payer: MEDICARE

## 2024-01-09 DIAGNOSIS — E11.69 TYPE 2 DIABETES MELLITUS WITH OTHER SPECIFIED COMPLICATION, WITHOUT LONG-TERM CURRENT USE OF INSULIN (HCC): ICD-10-CM

## 2024-01-09 DIAGNOSIS — E78.2 ELEVATED TRIGLYCERIDES WITH HIGH CHOLESTEROL: ICD-10-CM

## 2024-01-09 LAB
ALBUMIN SERPL BCP-MCNC: 4 G/DL (ref 3.2–4.9)
ALBUMIN/GLOB SERPL: 1.9 G/DL
ALP SERPL-CCNC: 53 U/L (ref 30–99)
ALT SERPL-CCNC: 21 U/L (ref 2–50)
ANION GAP SERPL CALC-SCNC: 9 MMOL/L (ref 7–16)
AST SERPL-CCNC: 19 U/L (ref 12–45)
BILIRUB SERPL-MCNC: 0.6 MG/DL (ref 0.1–1.5)
BUN SERPL-MCNC: 17 MG/DL (ref 8–22)
CALCIUM ALBUM COR SERPL-MCNC: 9.1 MG/DL (ref 8.5–10.5)
CALCIUM SERPL-MCNC: 9.1 MG/DL (ref 8.5–10.5)
CHLORIDE SERPL-SCNC: 105 MMOL/L (ref 96–112)
CHOLEST SERPL-MCNC: 96 MG/DL (ref 100–199)
CO2 SERPL-SCNC: 26 MMOL/L (ref 20–33)
CREAT SERPL-MCNC: 0.93 MG/DL (ref 0.5–1.4)
EST. AVERAGE GLUCOSE BLD GHB EST-MCNC: 117 MG/DL
FASTING STATUS PATIENT QL REPORTED: NORMAL
GFR SERPLBLD CREATININE-BSD FMLA CKD-EPI: 85 ML/MIN/1.73 M 2
GLOBULIN SER CALC-MCNC: 2.1 G/DL (ref 1.9–3.5)
GLUCOSE SERPL-MCNC: 113 MG/DL (ref 65–99)
HBA1C MFR BLD: 5.7 % (ref 4–5.6)
HDLC SERPL-MCNC: 40 MG/DL
LDLC SERPL CALC-MCNC: 37 MG/DL
POTASSIUM SERPL-SCNC: 4.2 MMOL/L (ref 3.6–5.5)
PROT SERPL-MCNC: 6.1 G/DL (ref 6–8.2)
SODIUM SERPL-SCNC: 140 MMOL/L (ref 135–145)
TRIGL SERPL-MCNC: 94 MG/DL (ref 0–149)
VIT B12 SERPL-MCNC: 467 PG/ML (ref 211–911)

## 2024-01-09 PROCEDURE — 82607 VITAMIN B-12: CPT

## 2024-01-09 PROCEDURE — 80053 COMPREHEN METABOLIC PANEL: CPT

## 2024-01-09 PROCEDURE — 80061 LIPID PANEL: CPT

## 2024-01-09 PROCEDURE — 82043 UR ALBUMIN QUANTITATIVE: CPT

## 2024-01-09 PROCEDURE — 82570 ASSAY OF URINE CREATININE: CPT

## 2024-01-09 PROCEDURE — 83036 HEMOGLOBIN GLYCOSYLATED A1C: CPT | Mod: GA

## 2024-01-10 LAB
CREAT UR-MCNC: 167.53 MG/DL
MICROALBUMIN UR-MCNC: 1.8 MG/DL
MICROALBUMIN/CREAT UR: 11 MG/G (ref 0–30)

## 2024-01-18 ENCOUNTER — OFFICE VISIT (OUTPATIENT)
Dept: MEDICAL GROUP | Facility: MEDICAL CENTER | Age: 74
End: 2024-01-18
Payer: MEDICARE

## 2024-01-18 VITALS
BODY MASS INDEX: 40.49 KG/M2 | SYSTOLIC BLOOD PRESSURE: 132 MMHG | DIASTOLIC BLOOD PRESSURE: 76 MMHG | OXYGEN SATURATION: 96 % | WEIGHT: 258 LBS | HEIGHT: 67 IN | TEMPERATURE: 97.3 F | HEART RATE: 63 BPM

## 2024-01-18 DIAGNOSIS — E78.2 ELEVATED TRIGLYCERIDES WITH HIGH CHOLESTEROL: ICD-10-CM

## 2024-01-18 DIAGNOSIS — E66.01 MORBID (SEVERE) OBESITY DUE TO EXCESS CALORIES (HCC): ICD-10-CM

## 2024-01-18 DIAGNOSIS — E11.69 TYPE 2 DIABETES MELLITUS WITH OTHER SPECIFIED COMPLICATION, WITHOUT LONG-TERM CURRENT USE OF INSULIN (HCC): ICD-10-CM

## 2024-01-18 DIAGNOSIS — C61 PROSTATE CANCER (HCC): ICD-10-CM

## 2024-01-18 PROCEDURE — 3078F DIAST BP <80 MM HG: CPT | Performed by: FAMILY MEDICINE

## 2024-01-18 PROCEDURE — 99214 OFFICE O/P EST MOD 30 MIN: CPT | Performed by: FAMILY MEDICINE

## 2024-01-18 PROCEDURE — 3075F SYST BP GE 130 - 139MM HG: CPT | Performed by: FAMILY MEDICINE

## 2024-01-18 ASSESSMENT — PATIENT HEALTH QUESTIONNAIRE - PHQ9: CLINICAL INTERPRETATION OF PHQ2 SCORE: 0

## 2024-01-18 ASSESSMENT — ENCOUNTER SYMPTOMS
CHILLS: 0
FEVER: 0
PALPITATIONS: 0

## 2024-01-18 NOTE — ASSESSMENT & PLAN NOTE
Chronic problem, unstable, although improving, his goal weight is to go below 250.  Continue to eat healthy diet and exercise.

## 2024-01-18 NOTE — PROGRESS NOTES
FAMILY MEDICINE VISIT                                                               Chief complaint::Diagnoses of Prostate cancer (HCC), Type 2 diabetes mellitus with other specified complication, without long-term current use of insulin (HCC), Body mass index (BMI) 40.0-44.9, adult (HCC), Elevated triglycerides with high cholesterol, and Morbid (severe) obesity due to excess calories (HCC) were pertinent to this visit.    History of present illness: Stas Gann is a 73 y.o. male who presented for lab follow up.    Problem   Elevated Triglycerides With High Cholesterol    Recent lipid panel showed cholesterol levels in normal range. He is on atorvastatin 10 mg daily.    Lab Results   Component Value Date/Time    CHOLSTRLTOT 96 (L) 01/09/2024 0842    TRIGLYCERIDE 94 01/09/2024 0842    HDL 40 01/09/2024 0842    LDL 37 01/09/2024 0842    CHOLHDLRAT 4.0 07/30/2009 1113         Morbid (Severe) Obesity Due to Excess Calories (Hcc)    Current BMI at 40.41.  He is working on improving his diet and exercise and he lost 7 pounds since I last saw him.       Prostate Cancer (Hcc)    Was diagnosed with prostate cancer in 2018, received 28 treatments of radiation therapy.  PSA level came back in normal range.  Previously was following with urology, currently not following.        Type 2 Diabetes Mellitus With Other Specified Complication (Hcc)    Recent A1c came back at 5.7 which improved from previous numbers.  He is taking metformin 500 mg daily.      Lab Results   Component Value Date/Time    HBA1C 5.7 (H) 01/09/2024 08:42 AM                Review of systems:     Review of Systems   Constitutional:  Negative for chills, fever and malaise/fatigue.   Cardiovascular:  Negative for chest pain, palpitations and leg swelling.        Medications and Allergies:     Current Outpatient Medications   Medication Sig Dispense Refill    benazepril (LOTENSIN) 40 MG tablet TAKE 1 TABLET DAILY 90 Tablet 3    amLODIPine (NORVASC) 10 MG  "Tab TAKE 1 TABLET DAILY 90 Tablet 3    metFORMIN (GLUCOPHAGE) 500 MG Tab Take 1 Tablet by mouth 2 times a day with meals. 180 Tablet 3    atorvastatin (LIPITOR) 10 MG Tab TAKE 1 TABLET DAILY 90 Tablet 3    tadalafil (CIALIS) 5 MG tablet Take 1 Tablet by mouth every day. 90 Tablet 3     No current facility-administered medications for this visit.          Vitals:    /76   Pulse 63   Temp 36.3 °C (97.3 °F)   Ht 1.702 m (5' 7\")   Wt 117 kg (258 lb)   SpO2 96%  Body mass index is 40.41 kg/m².    Physical Exam:     Physical Exam  Constitutional:       Appearance: Normal appearance. He is well-developed and well-groomed.   HENT:      Head: Normocephalic and atraumatic.      Right Ear: External ear normal.      Left Ear: External ear normal.   Eyes:      General:         Right eye: No discharge.         Left eye: No discharge.      Conjunctiva/sclera: Conjunctivae normal.   Cardiovascular:      Rate and Rhythm: Normal rate.   Pulmonary:      Effort: Pulmonary effort is normal. No respiratory distress.   Musculoskeletal:      Cervical back: Neck supple.   Skin:     Findings: No rash.   Neurological:      Mental Status: He is alert.   Psychiatric:         Mood and Affect: Mood and affect normal.         Behavior: Behavior normal.          Labs:  I reviewed with patient recent labs resulted on 1/9/2024.    Assessment/Plan:    HCC Gap Form    Diagnosis: E66.01 - Morbid (severe) obesity due to excess calories (HCC)  Z68.41 - Body mass index (BMI) 40.0-44.9, adult (HCC)  The current BMI is 40.41 kg/m2 as of 01/18/24 14:05 PST  Assessment and plan: Chronic, improving.  He lost 7 pounds since I last saw him.  Encouraged healthy diet and physical activity changes with a goal of weight loss. Follow up at least annually.  Diagnosis to address: C61 - Prostate cancer (HCC)  Assessment and plan: Chronic, stable. Continue with current defined treatment plan: Last PSA level normal, recheck labs. Follow-up at least " annually.  Diagnosis: E11.69 - Type 2 diabetes mellitus with other specified complication, without long-term current use of insulin (HCC)  Assessment and plan: Chronic, stable. Continue with current defined treatment plan: Recent A1c came back at 5.7.  Continue metformin follow-up at least annually.  Last edited 01/18/24 14:08 PST by Ronnell Marcelo M.D.          Problem List Items Addressed This Visit       Type 2 diabetes mellitus with other specified complication (HCC)     Chronic problem, stable, continue metformin 500 mg daily, recheck labs in 6 months.         Relevant Orders    Comp Metabolic Panel    HEMOGLOBIN A1C    Morbid (severe) obesity due to excess calories (HCC)     Chronic problem, unstable, although improving, his goal weight is to go below 250.  Continue to eat healthy diet and exercise.         Prostate cancer (HCC)     Chronic problem, stable, recheck PSA level with next blood test.         Relevant Orders    PROSTATE SPECIFIC AG DIAGNOSTIC    Elevated triglycerides with high cholesterol     Chronic problem, stable, continue Lipitor 10 mg daily.         Relevant Orders    Lipid Profile    Body mass index (BMI) 40.0-44.9, adult (HCC)        Please note that this dictation was created using voice recognition software. I have made every reasonable attempt to correct obvious errors, but I expect that there are errors of grammar and possibly content that I did not discover before finalizing the note.    Follow up in 6 months for lab follow up.

## 2024-05-10 DIAGNOSIS — E78.00 ELEVATED LDL CHOLESTEROL LEVEL: ICD-10-CM

## 2024-05-10 RX ORDER — ATORVASTATIN CALCIUM 10 MG/1
TABLET, FILM COATED ORAL
Qty: 90 TABLET | Refills: 3 | Status: SHIPPED | OUTPATIENT
Start: 2024-05-10

## 2024-05-10 NOTE — TELEPHONE ENCOUNTER
Received request via: Pharmacy    Was the patient seen in the last year in this department? Yes    Does the patient have an active prescription (recently filled or refills available) for medication(s) requested? No    [facility]    Does the patient have [facility] Plus and need 100 day supply (blood pressure, diabetes and cholesterol meds only)? Patient does not have SCP

## 2024-07-11 ENCOUNTER — HOSPITAL ENCOUNTER (OUTPATIENT)
Dept: LAB | Facility: MEDICAL CENTER | Age: 74
End: 2024-07-11
Attending: FAMILY MEDICINE
Payer: MEDICARE

## 2024-07-11 DIAGNOSIS — E78.2 ELEVATED TRIGLYCERIDES WITH HIGH CHOLESTEROL: ICD-10-CM

## 2024-07-11 DIAGNOSIS — C61 PROSTATE CANCER (HCC): ICD-10-CM

## 2024-07-11 DIAGNOSIS — E11.69 TYPE 2 DIABETES MELLITUS WITH OTHER SPECIFIED COMPLICATION, WITHOUT LONG-TERM CURRENT USE OF INSULIN (HCC): ICD-10-CM

## 2024-07-11 LAB
ALBUMIN SERPL BCP-MCNC: 3.9 G/DL (ref 3.2–4.9)
ALBUMIN/GLOB SERPL: 1.7 G/DL
ALP SERPL-CCNC: 57 U/L (ref 30–99)
ALT SERPL-CCNC: 23 U/L (ref 2–50)
ANION GAP SERPL CALC-SCNC: 11 MMOL/L (ref 7–16)
AST SERPL-CCNC: 16 U/L (ref 12–45)
BILIRUB SERPL-MCNC: 0.4 MG/DL (ref 0.1–1.5)
BUN SERPL-MCNC: 16 MG/DL (ref 8–22)
CALCIUM ALBUM COR SERPL-MCNC: 9.2 MG/DL (ref 8.5–10.5)
CALCIUM SERPL-MCNC: 9.1 MG/DL (ref 8.5–10.5)
CHLORIDE SERPL-SCNC: 105 MMOL/L (ref 96–112)
CHOLEST SERPL-MCNC: 98 MG/DL (ref 100–199)
CO2 SERPL-SCNC: 24 MMOL/L (ref 20–33)
CREAT SERPL-MCNC: 0.86 MG/DL (ref 0.5–1.4)
EST. AVERAGE GLUCOSE BLD GHB EST-MCNC: 120 MG/DL
FASTING STATUS PATIENT QL REPORTED: NORMAL
GFR SERPLBLD CREATININE-BSD FMLA CKD-EPI: 89 ML/MIN/1.73 M 2
GLOBULIN SER CALC-MCNC: 2.3 G/DL (ref 1.9–3.5)
GLUCOSE SERPL-MCNC: 125 MG/DL (ref 65–99)
HBA1C MFR BLD: 5.8 % (ref 4–5.6)
HDLC SERPL-MCNC: 44 MG/DL
LDLC SERPL CALC-MCNC: 44 MG/DL
POTASSIUM SERPL-SCNC: 4.4 MMOL/L (ref 3.6–5.5)
PROT SERPL-MCNC: 6.2 G/DL (ref 6–8.2)
PSA SERPL-MCNC: 0.69 NG/ML (ref 0–4)
SODIUM SERPL-SCNC: 140 MMOL/L (ref 135–145)
TRIGL SERPL-MCNC: 49 MG/DL (ref 0–149)

## 2024-07-11 PROCEDURE — 80053 COMPREHEN METABOLIC PANEL: CPT

## 2024-07-11 PROCEDURE — 84153 ASSAY OF PSA TOTAL: CPT

## 2024-07-11 PROCEDURE — 83036 HEMOGLOBIN GLYCOSYLATED A1C: CPT | Mod: GA

## 2024-07-11 PROCEDURE — 36415 COLL VENOUS BLD VENIPUNCTURE: CPT

## 2024-07-11 PROCEDURE — 80061 LIPID PANEL: CPT

## 2024-07-15 DIAGNOSIS — I10 ESSENTIAL HYPERTENSION: ICD-10-CM

## 2024-07-15 RX ORDER — AMLODIPINE BESYLATE 10 MG/1
10 TABLET ORAL DAILY
Qty: 90 TABLET | Refills: 3 | Status: SHIPPED | OUTPATIENT
Start: 2024-07-15

## 2024-07-15 RX ORDER — BENAZEPRIL HYDROCHLORIDE 40 MG/1
40 TABLET ORAL DAILY
Qty: 90 TABLET | Refills: 3 | Status: SHIPPED | OUTPATIENT
Start: 2024-07-15

## 2024-07-25 ENCOUNTER — OFFICE VISIT (OUTPATIENT)
Dept: MEDICAL GROUP | Facility: MEDICAL CENTER | Age: 74
End: 2024-07-25
Payer: MEDICARE

## 2024-07-25 VITALS
BODY MASS INDEX: 40.49 KG/M2 | SYSTOLIC BLOOD PRESSURE: 108 MMHG | WEIGHT: 258 LBS | RESPIRATION RATE: 13 BRPM | OXYGEN SATURATION: 97 % | TEMPERATURE: 98.7 F | DIASTOLIC BLOOD PRESSURE: 70 MMHG | HEART RATE: 70 BPM | HEIGHT: 67 IN

## 2024-07-25 DIAGNOSIS — E55.9 VITAMIN D DEFICIENCY: ICD-10-CM

## 2024-07-25 DIAGNOSIS — E66.01 MORBID (SEVERE) OBESITY DUE TO EXCESS CALORIES (HCC): ICD-10-CM

## 2024-07-25 DIAGNOSIS — Z97.0 PRESENCE OF ARTIFICIAL LEFT EYE: ICD-10-CM

## 2024-07-25 DIAGNOSIS — C61 PROSTATE CANCER (HCC): ICD-10-CM

## 2024-07-25 DIAGNOSIS — Z12.11 COLON CANCER SCREENING: ICD-10-CM

## 2024-07-25 DIAGNOSIS — M17.11 PRIMARY OSTEOARTHRITIS OF RIGHT KNEE: ICD-10-CM

## 2024-07-25 DIAGNOSIS — E11.69 TYPE 2 DIABETES MELLITUS WITH OTHER SPECIFIED COMPLICATION, WITHOUT LONG-TERM CURRENT USE OF INSULIN (HCC): ICD-10-CM

## 2024-07-25 DIAGNOSIS — K57.30 DIVERTICULOSIS OF COLON: ICD-10-CM

## 2024-07-25 DIAGNOSIS — E78.2 ELEVATED TRIGLYCERIDES WITH HIGH CHOLESTEROL: ICD-10-CM

## 2024-07-25 DIAGNOSIS — R06.83 SNORING: ICD-10-CM

## 2024-07-25 DIAGNOSIS — R35.1 BENIGN PROSTATIC HYPERPLASIA WITH NOCTURIA: ICD-10-CM

## 2024-07-25 DIAGNOSIS — I10 PRIMARY HYPERTENSION: ICD-10-CM

## 2024-07-25 DIAGNOSIS — N40.1 BENIGN PROSTATIC HYPERPLASIA WITH NOCTURIA: ICD-10-CM

## 2024-07-25 DIAGNOSIS — Z86.010 HISTORY OF COLON POLYPS: ICD-10-CM

## 2024-07-25 DIAGNOSIS — N52.9 ERECTILE DYSFUNCTION, UNSPECIFIED ERECTILE DYSFUNCTION TYPE: ICD-10-CM

## 2024-07-25 DIAGNOSIS — R74.8 ELEVATED LIVER ENZYMES: ICD-10-CM

## 2024-07-25 DIAGNOSIS — Z00.00 MEDICARE ANNUAL WELLNESS VISIT, SUBSEQUENT: ICD-10-CM

## 2024-07-25 DIAGNOSIS — I45.10 RBBB (RIGHT BUNDLE BRANCH BLOCK): ICD-10-CM

## 2024-07-25 PROBLEM — M79.89 LEG SWELLING: Status: RESOLVED | Noted: 2023-06-01 | Resolved: 2024-07-25

## 2024-07-25 PROCEDURE — 3078F DIAST BP <80 MM HG: CPT | Performed by: FAMILY MEDICINE

## 2024-07-25 PROCEDURE — 99214 OFFICE O/P EST MOD 30 MIN: CPT | Mod: 25 | Performed by: FAMILY MEDICINE

## 2024-07-25 PROCEDURE — G0439 PPPS, SUBSEQ VISIT: HCPCS | Performed by: FAMILY MEDICINE

## 2024-07-25 PROCEDURE — 3074F SYST BP LT 130 MM HG: CPT | Performed by: FAMILY MEDICINE

## 2024-07-25 ASSESSMENT — ENCOUNTER SYMPTOMS
HEMOPTYSIS: 0
ABDOMINAL PAIN: 0
SHORTNESS OF BREATH: 0
SENSORY CHANGE: 0
GENERAL WELL-BEING: GOOD
WHEEZING: 0
HEADACHES: 0
BLOOD IN STOOL: 0
MYALGIAS: 0
DEPRESSION: 0
FOCAL WEAKNESS: 0
FEVER: 0
CHILLS: 0
COUGH: 0
NAUSEA: 0
CONSTIPATION: 0
DIARRHEA: 0
VOMITING: 0
PALPITATIONS: 0
NERVOUS/ANXIOUS: 0
DIZZINESS: 0

## 2024-07-25 ASSESSMENT — ACTIVITIES OF DAILY LIVING (ADL): BATHING_REQUIRES_ASSISTANCE: 0

## 2024-07-25 ASSESSMENT — PATIENT HEALTH QUESTIONNAIRE - PHQ9: CLINICAL INTERPRETATION OF PHQ2 SCORE: 0

## 2025-01-06 ENCOUNTER — HOSPITAL ENCOUNTER (OUTPATIENT)
Dept: LAB | Facility: MEDICAL CENTER | Age: 75
End: 2025-01-06
Attending: FAMILY MEDICINE
Payer: MEDICARE

## 2025-01-06 DIAGNOSIS — E55.9 VITAMIN D DEFICIENCY: ICD-10-CM

## 2025-01-06 DIAGNOSIS — E11.69 TYPE 2 DIABETES MELLITUS WITH OTHER SPECIFIED COMPLICATION, WITHOUT LONG-TERM CURRENT USE OF INSULIN (HCC): ICD-10-CM

## 2025-01-06 DIAGNOSIS — I10 PRIMARY HYPERTENSION: ICD-10-CM

## 2025-01-06 LAB
25(OH)D3 SERPL-MCNC: 31 NG/ML (ref 30–100)
ALBUMIN SERPL BCP-MCNC: 4 G/DL (ref 3.2–4.9)
ALBUMIN/GLOB SERPL: 1.7 G/DL
ALP SERPL-CCNC: 56 U/L (ref 30–99)
ALT SERPL-CCNC: 28 U/L (ref 2–50)
ANION GAP SERPL CALC-SCNC: 8 MMOL/L (ref 7–16)
AST SERPL-CCNC: 24 U/L (ref 12–45)
BILIRUB SERPL-MCNC: 0.5 MG/DL (ref 0.1–1.5)
BUN SERPL-MCNC: 15 MG/DL (ref 8–22)
CALCIUM ALBUM COR SERPL-MCNC: 9.4 MG/DL (ref 8.5–10.5)
CALCIUM SERPL-MCNC: 9.4 MG/DL (ref 8.5–10.5)
CHLORIDE SERPL-SCNC: 105 MMOL/L (ref 96–112)
CHOLEST SERPL-MCNC: 113 MG/DL (ref 100–199)
CO2 SERPL-SCNC: 25 MMOL/L (ref 20–33)
CREAT SERPL-MCNC: 0.97 MG/DL (ref 0.5–1.4)
CREAT UR-MCNC: 207.91 MG/DL
EST. AVERAGE GLUCOSE BLD GHB EST-MCNC: 123 MG/DL
FASTING STATUS PATIENT QL REPORTED: NORMAL
GFR SERPLBLD CREATININE-BSD FMLA CKD-EPI: 80 ML/MIN/1.73 M 2
GLOBULIN SER CALC-MCNC: 2.4 G/DL (ref 1.9–3.5)
GLUCOSE SERPL-MCNC: 135 MG/DL (ref 65–99)
HBA1C MFR BLD: 5.9 % (ref 4–5.6)
HDLC SERPL-MCNC: 47 MG/DL
LDLC SERPL CALC-MCNC: 50 MG/DL
MICROALBUMIN UR-MCNC: <1.2 MG/DL
MICROALBUMIN/CREAT UR: NORMAL MG/G (ref 0–30)
POTASSIUM SERPL-SCNC: 4.5 MMOL/L (ref 3.6–5.5)
PROT SERPL-MCNC: 6.4 G/DL (ref 6–8.2)
SODIUM SERPL-SCNC: 138 MMOL/L (ref 135–145)
TRIGL SERPL-MCNC: 78 MG/DL (ref 0–149)
VIT B12 SERPL-MCNC: 569 PG/ML (ref 211–911)

## 2025-01-06 PROCEDURE — 80053 COMPREHEN METABOLIC PANEL: CPT

## 2025-01-06 PROCEDURE — 82306 VITAMIN D 25 HYDROXY: CPT

## 2025-01-06 PROCEDURE — 83036 HEMOGLOBIN GLYCOSYLATED A1C: CPT | Mod: GA

## 2025-01-06 PROCEDURE — 82607 VITAMIN B-12: CPT

## 2025-01-06 PROCEDURE — 36415 COLL VENOUS BLD VENIPUNCTURE: CPT

## 2025-01-06 PROCEDURE — 80061 LIPID PANEL: CPT

## 2025-01-06 PROCEDURE — 82043 UR ALBUMIN QUANTITATIVE: CPT

## 2025-01-06 PROCEDURE — 82570 ASSAY OF URINE CREATININE: CPT

## 2025-01-21 ENCOUNTER — OFFICE VISIT (OUTPATIENT)
Dept: MEDICAL GROUP | Facility: MEDICAL CENTER | Age: 75
End: 2025-01-21
Payer: MEDICARE

## 2025-01-21 VITALS
DIASTOLIC BLOOD PRESSURE: 74 MMHG | HEIGHT: 67 IN | TEMPERATURE: 97.8 F | WEIGHT: 263.67 LBS | HEART RATE: 70 BPM | SYSTOLIC BLOOD PRESSURE: 118 MMHG | BODY MASS INDEX: 41.38 KG/M2 | OXYGEN SATURATION: 97 %

## 2025-01-21 DIAGNOSIS — E55.9 VITAMIN D DEFICIENCY: ICD-10-CM

## 2025-01-21 DIAGNOSIS — C61 PROSTATE CANCER (HCC): ICD-10-CM

## 2025-01-21 DIAGNOSIS — E66.01 MORBID (SEVERE) OBESITY DUE TO EXCESS CALORIES (HCC): ICD-10-CM

## 2025-01-21 DIAGNOSIS — M79.672 LEFT FOOT PAIN: ICD-10-CM

## 2025-01-21 DIAGNOSIS — E11.69 TYPE 2 DIABETES MELLITUS WITH OTHER SPECIFIED COMPLICATION, WITHOUT LONG-TERM CURRENT USE OF INSULIN (HCC): ICD-10-CM

## 2025-01-21 PROCEDURE — 3074F SYST BP LT 130 MM HG: CPT | Performed by: FAMILY MEDICINE

## 2025-01-21 PROCEDURE — 99214 OFFICE O/P EST MOD 30 MIN: CPT | Performed by: FAMILY MEDICINE

## 2025-01-21 PROCEDURE — 3078F DIAST BP <80 MM HG: CPT | Performed by: FAMILY MEDICINE

## 2025-01-21 ASSESSMENT — PATIENT HEALTH QUESTIONNAIRE - PHQ9: CLINICAL INTERPRETATION OF PHQ2 SCORE: 0

## 2025-01-21 ASSESSMENT — ENCOUNTER SYMPTOMS
CHILLS: 0
FEVER: 0

## 2025-01-21 NOTE — PROGRESS NOTES
Verbal consent was acquired by the patient to use [employer] ambient listening note generation during this visit.      Kaushik was seen today for follow-up.    Diagnoses and all orders for this visit:    Left foot pain  -     DX-FOOT-2- LEFT; Future    Type 2 diabetes mellitus with other specified complication, without long-term current use of insulin (HCC)  -     Comp Metabolic Panel; Future  -     HEMOGLOBIN A1C; Future    Morbid (severe) obesity due to excess calories (HCC)    Body mass index (BMI) 40.0-44.9, adult (HCC)    Vitamin D deficiency  -     VITAMIN D,25 HYDROXY (DEFICIENCY); Future    Prostate cancer (HCC)  -     PROSTATE SPECIFIC AG DIAGNOSTIC; Future                  Assessment & Plan  1. Left foot pain:  New condition, unstable  - Pain on the lateral side, started 3-4 months ago, rated 4/10  - Exacerbated by activities such as exercising and bowling  - No history of injury or previous surgeries, no redness or swelling observed  - Suspected plantar fasciitis  - Order x-ray of the left foot to rule out arthritis  - Use Voltaren gel, applying 2 g every 6 hours as needed for pain and inflammation  - Referral to a podiatrist or physical therapy depending upon x-ray results.    2. Vitamin D deficiency  Chronic condition, unstable  - Vitamin D level slightly below normal at 31  - Double current vitamin D intake to 100 micrograms daily    3.  Type 2 diabetes mellitus  Chronic condition, stable  - A1c level mildly elevated at 5.9, fasting blood glucose slightly high  - Weight increased from 258 to 263  - Monitor carbohydrate and sugar intake  - Dietary recommendations: consume protein and vegetables before carbohydrates, reduce potato intake by half, incorporate more vegetables into diet  - Changes expected to aid in weight loss and blood sugar control    4.  Obesity  Chronic condition, unstable, counseling provided.  Health Maintenance  - Referral for a Cologuard test to screen for potential colon polyps  -  Consider colonoscopy if Cologuard test is positive    5.  Prostate cancer:  Chronic condition, stable  Was diagnosed with prostate cancer in 2018, received 28 treatments of radiation therapy.  PSA level came back in normal range.  Previously was following with urology, currently not following.  Recent PSA level came back normal.  Recheck PSA with next blood test.    Follow-up  - Patient to follow up in 6 months for blood work.          Chief complaint::Diagnoses of Left foot pain, Type 2 diabetes mellitus with other specified complication, without long-term current use of insulin (HCC), Morbid (severe) obesity due to excess calories (HCC), Body mass index (BMI) 40.0-44.9, adult (HCC), Vitamin D deficiency, and Prostate cancer (HCC) were pertinent to this visit.      History of Present Illness  The patient is a 74-year-old male who presents for a follow-up and evaluation of left foot pain.    Left Foot Pain  - Began experiencing bilateral foot pain approximately 3 to 4 months ago, with the left foot being more severely affected  - Pain is localized to the lateral aspect of the foot, specifically in the central region  - Reports no history of trauma or surgical interventions to the foot  - Typically wears only socks at home and does not experience significant discomfort  - Pain becomes more noticeable during physical activities such as exercise or bowling  - Quantifies the pain as a 4 on a scale of 10, indicating it is manageable but persistent  - Reports no associated erythema or edema    Colonoscopy Appointment  - Had to wait 6 months to get an appointment for a colonoscopy  - Went in a couple of weeks ago, and they said since he is over 75, they are not going to do a colonoscopy  - Had a couple of polyps removed previously  - Suggested going with Cologuard  - Told that if the Cologuard comes back negative, he is done, but if it is positive, he will need to go in for a colonoscopy    He is not big on sugar, so it  "has got to be carbs. He eats carbs the most for dinner, including potatoes, rice, and pasta. He is flexible with his diet. He eats white rice.      Review of Systems   Constitutional:  Negative for chills and fever.   Musculoskeletal:         Left foot pain          Medications and Allergies:     Current Outpatient Medications   Medication Sig Dispense Refill    multivitamin Tab Take 1 Tablet by mouth every day.      metFORMIN (GLUCOPHAGE) 500 MG Tab TAKE 1 TABLET BY MOUTH TWICE A DAY WITH MEALS 180 Tablet 3    amLODIPine (NORVASC) 10 MG Tab Take 1 Tablet by mouth every day. 90 Tablet 3    benazepril (LOTENSIN) 40 MG tablet Take 1 Tablet by mouth every day. 90 Tablet 3    atorvastatin (LIPITOR) 10 MG Tab TAKE 1 TABLET DAILY 90 Tablet 3    tadalafil (CIALIS) 5 MG tablet Take 1 Tablet by mouth every day. 90 Tablet 3     No current facility-administered medications for this visit.       /74 (BP Location: Left arm, Patient Position: Sitting, BP Cuff Size: Adult)   Pulse 70   Temp 36.6 °C (97.8 °F) (Temporal)   Ht 1.702 m (5' 7\")   Wt 120 kg (263 lb 10.7 oz)   SpO2 97% , Body mass index is 41.3 kg/m².      Physical Exam  Constitutional:       Appearance: Normal appearance. He is well-developed and well-groomed.   HENT:      Head: Normocephalic and atraumatic.      Right Ear: External ear normal.      Left Ear: External ear normal.   Eyes:      General:         Right eye: No discharge.         Left eye: No discharge.      Conjunctiva/sclera: Conjunctivae normal.   Cardiovascular:      Rate and Rhythm: Normal rate.   Pulmonary:      Effort: Pulmonary effort is normal. No respiratory distress.   Musculoskeletal:      Cervical back: Neck supple.      Comments: No tenderness on palpation at lateral side of left foot.  No redness or swelling.   Skin:     Findings: No rash.   Neurological:      Mental Status: He is alert.   Psychiatric:         Mood and Affect: Mood and affect normal.         Behavior: Behavior " normal.            I reviewed with patient lab results resulted on 1/6/2025.        Please note that this dictation was created using voice recognition software. I have made every reasonable attempt to correct obvious errors, but I expect that there are errors of grammar and possibly content that I did not discover before finalizing the note.

## 2025-02-04 ENCOUNTER — APPOINTMENT (OUTPATIENT)
Dept: RADIOLOGY | Facility: MEDICAL CENTER | Age: 75
End: 2025-02-04
Attending: FAMILY MEDICINE
Payer: MEDICARE

## 2025-02-04 DIAGNOSIS — M79.672 LEFT FOOT PAIN: ICD-10-CM

## 2025-02-04 PROCEDURE — 73620 X-RAY EXAM OF FOOT: CPT | Mod: LT

## 2025-05-05 DIAGNOSIS — E78.00 ELEVATED LDL CHOLESTEROL LEVEL: ICD-10-CM

## 2025-05-05 RX ORDER — ATORVASTATIN CALCIUM 10 MG/1
10 TABLET, FILM COATED ORAL DAILY
Qty: 90 TABLET | Refills: 3 | Status: SHIPPED | OUTPATIENT
Start: 2025-05-05

## 2025-05-05 NOTE — TELEPHONE ENCOUNTER
Received request via: Pharmacy    Was the patient seen in the last year in this department? Yes    Does the patient have an active prescription (recently filled or refills available) for medication(s) requested? No    [facility]     Does the patient have [facility] Plus and need 100-day supply? (This applies to ALL medications) Patient does not have SCP

## 2025-07-08 DIAGNOSIS — I10 ESSENTIAL HYPERTENSION: ICD-10-CM

## 2025-07-08 RX ORDER — AMLODIPINE BESYLATE 10 MG/1
10 TABLET ORAL DAILY
Qty: 90 TABLET | Refills: 3 | Status: SHIPPED | OUTPATIENT
Start: 2025-07-08

## 2025-07-08 RX ORDER — BENAZEPRIL HYDROCHLORIDE 40 MG/1
40 TABLET ORAL DAILY
Qty: 90 TABLET | Refills: 3 | Status: SHIPPED | OUTPATIENT
Start: 2025-07-08

## 2025-07-08 NOTE — TELEPHONE ENCOUNTER
Received request via: Pharmacy    Was the patient seen in the last year in this department? Yes    Does the patient have an active prescription (recently filled or refills available) for medication(s) requested? No    Pharmacy Name: [facility]    Does the patient have residential Plus and need 100-day supply? (This applies to ALL medications) Patient does not have SCP

## 2025-07-10 ENCOUNTER — HOSPITAL ENCOUNTER (OUTPATIENT)
Dept: LAB | Facility: MEDICAL CENTER | Age: 75
End: 2025-07-10
Attending: FAMILY MEDICINE
Payer: MEDICARE

## 2025-07-10 DIAGNOSIS — E55.9 VITAMIN D DEFICIENCY: ICD-10-CM

## 2025-07-10 DIAGNOSIS — C61 PROSTATE CANCER (HCC): ICD-10-CM

## 2025-07-10 DIAGNOSIS — E11.69 TYPE 2 DIABETES MELLITUS WITH OTHER SPECIFIED COMPLICATION, WITHOUT LONG-TERM CURRENT USE OF INSULIN (HCC): ICD-10-CM

## 2025-07-10 LAB
25(OH)D3 SERPL-MCNC: 42 NG/ML (ref 30–100)
ALBUMIN SERPL BCP-MCNC: 4.1 G/DL (ref 3.2–4.9)
ALBUMIN/GLOB SERPL: 1.8 G/DL
ALP SERPL-CCNC: 53 U/L (ref 30–99)
ALT SERPL-CCNC: 31 U/L (ref 2–50)
ANION GAP SERPL CALC-SCNC: 8 MMOL/L (ref 7–16)
AST SERPL-CCNC: 22 U/L (ref 12–45)
BILIRUB SERPL-MCNC: 0.5 MG/DL (ref 0.1–1.5)
BUN SERPL-MCNC: 14 MG/DL (ref 8–22)
CALCIUM ALBUM COR SERPL-MCNC: 9.2 MG/DL (ref 8.5–10.5)
CALCIUM SERPL-MCNC: 9.3 MG/DL (ref 8.5–10.5)
CHLORIDE SERPL-SCNC: 104 MMOL/L (ref 96–112)
CO2 SERPL-SCNC: 26 MMOL/L (ref 20–33)
CREAT SERPL-MCNC: 0.97 MG/DL (ref 0.5–1.4)
EST. AVERAGE GLUCOSE BLD GHB EST-MCNC: 128 MG/DL
GFR SERPLBLD CREATININE-BSD FMLA CKD-EPI: 80 ML/MIN/1.73 M 2
GLOBULIN SER CALC-MCNC: 2.3 G/DL (ref 1.9–3.5)
GLUCOSE SERPL-MCNC: 133 MG/DL (ref 65–99)
HBA1C MFR BLD: 6.1 % (ref 4–5.6)
POTASSIUM SERPL-SCNC: 4.5 MMOL/L (ref 3.6–5.5)
PROT SERPL-MCNC: 6.4 G/DL (ref 6–8.2)
PSA SERPL DL<=0.01 NG/ML-MCNC: 0.54 NG/ML (ref 0–4)
SODIUM SERPL-SCNC: 138 MMOL/L (ref 135–145)

## 2025-07-10 PROCEDURE — 82306 VITAMIN D 25 HYDROXY: CPT

## 2025-07-10 PROCEDURE — 83036 HEMOGLOBIN GLYCOSYLATED A1C: CPT | Mod: GA

## 2025-07-10 PROCEDURE — 80053 COMPREHEN METABOLIC PANEL: CPT

## 2025-07-10 PROCEDURE — 84153 ASSAY OF PSA TOTAL: CPT

## 2025-07-10 PROCEDURE — 36415 COLL VENOUS BLD VENIPUNCTURE: CPT

## 2025-07-22 ENCOUNTER — OFFICE VISIT (OUTPATIENT)
Dept: MEDICAL GROUP | Facility: MEDICAL CENTER | Age: 75
End: 2025-07-22
Payer: MEDICARE

## 2025-07-22 VITALS
BODY MASS INDEX: 41.26 KG/M2 | SYSTOLIC BLOOD PRESSURE: 130 MMHG | WEIGHT: 272.27 LBS | OXYGEN SATURATION: 96 % | DIASTOLIC BLOOD PRESSURE: 78 MMHG | TEMPERATURE: 98.9 F | HEART RATE: 73 BPM | HEIGHT: 68 IN

## 2025-07-22 DIAGNOSIS — E11.69 TYPE 2 DIABETES MELLITUS WITH OTHER SPECIFIED COMPLICATION, WITHOUT LONG-TERM CURRENT USE OF INSULIN (HCC): Primary | ICD-10-CM

## 2025-07-22 DIAGNOSIS — E78.2 ELEVATED TRIGLYCERIDES WITH HIGH CHOLESTEROL: ICD-10-CM

## 2025-07-22 DIAGNOSIS — M79.671 RIGHT FOOT PAIN: ICD-10-CM

## 2025-07-22 PROCEDURE — 3075F SYST BP GE 130 - 139MM HG: CPT | Performed by: FAMILY MEDICINE

## 2025-07-22 PROCEDURE — 3078F DIAST BP <80 MM HG: CPT | Performed by: FAMILY MEDICINE

## 2025-07-22 PROCEDURE — 99214 OFFICE O/P EST MOD 30 MIN: CPT | Performed by: FAMILY MEDICINE

## 2025-07-22 ASSESSMENT — ENCOUNTER SYMPTOMS
FEVER: 0
CHILLS: 0

## 2025-07-22 NOTE — PROGRESS NOTES
FAMILY MEDICINE VISIT                                                               Assessment/Plan:         Problem List Items Addressed This Visit       Elevated triglycerides with high cholesterol    Chronic condition, stable, continue Lipitor/atorvastatin 10 mg daily         Relevant Orders    Lipid Profile    Type 2 diabetes mellitus with other specified complication (HCC) - Primary    Chronic condition, stable, although A1c has increased mildly from previous numbers.  We discussed about Rybelsus medication.  He would like to wait.  Continue metformin at same dose.  Recheck labs in 6-month         Relevant Orders    Diabetic Monofilament Lower Extremity Exam (Completed)    Comp Metabolic Panel    HEMOGLOBIN A1C    VITAMIN B12    MICROALBUMIN CREAT RATIO URINE     Other Visit Diagnoses         Right foot pain   :  New condition, unstable, he had x-rays done at [facility] and is going to follow-up with them.  Likely plantar fasciitis.               Follow up in 6 months for lab follow-up      Chief complaint::The primary encounter diagnosis was Type 2 diabetes mellitus with other specified complication, without long-term current use of insulin (HCC). Diagnoses of Elevated triglycerides with high cholesterol and Right foot pain were also pertinent to this visit.    History of present illness: Stas Gann is a 75 y.o. male who presented for lab follow-up.    Problem   Elevated Triglycerides With High Cholesterol    Recent lipid panel showed cholesterol levels in normal range. He is on atorvastatin 10 mg daily.    Lab Results   Component Value Date/Time    CHOLSTRLTOT 113 01/06/2025 0803    TRIGLYCERIDE 78 01/06/2025 0803    HDL 47 01/06/2025 0803    LDL 50 01/06/2025 0803    CHOLHDLRAT 4.0 07/30/2009 1113          Type 2 Diabetes Mellitus With Other Specified Complication (Hcc)    Recent A1c came back at 5.8.  He is taking metformin 500 mg twice daily.      Lab Results   Component Value Date/Time    HBA1C 6.1 (H)  "07/10/2025 07:53 AM                Review of systems:     Review of Systems   Constitutional:  Negative for chills and fever.   Musculoskeletal:         Right foot pain on lateral side        Medications and Allergies:         Current Outpatient Medications:     benazepril, 40 mg, Oral, DAILY, Taking    amLODIPine, 10 mg, Oral, DAILY, Taking    atorvastatin, 10 mg, Oral, DAILY, Taking    multivitamin, 1 Tablet, Oral, DAILY, Taking    metFORMIN, 500 mg, Oral, BID WITH MEALS, Taking    tadalafil, 5 mg, Oral, DAILY, Taking        Vitals:    /78 (BP Location: Left arm, Patient Position: Sitting, BP Cuff Size: Adult)   Pulse 73   Temp 37.2 °C (98.9 °F) (Temporal)   Ht 1.727 m (5' 8\")   Wt 124 kg (272 lb 4.3 oz)   SpO2 96%  Body mass index is 41.4 kg/m².    Physical Exam:     Physical Exam  Constitutional:       Appearance: Normal appearance. He is well-developed and well-groomed.   HENT:      Head: Normocephalic and atraumatic.      Right Ear: External ear normal.      Left Ear: External ear normal.   Eyes:      General:         Right eye: No discharge.         Left eye: No discharge.      Conjunctiva/sclera: Conjunctivae normal.   Cardiovascular:      Rate and Rhythm: Normal rate.   Pulmonary:      Effort: Pulmonary effort is normal. No respiratory distress.   Musculoskeletal:      Cervical back: Neck supple.   Skin:     Findings: No rash.   Neurological:      Mental Status: He is alert.   Psychiatric:         Mood and Affect: Mood and affect normal.         Behavior: Behavior normal.        Monofilament testing with a 10 gram force: sensation intact: intact bilaterally  Visual Inspection: Feet without maceration, ulcers, fissures.  Pedal pulses: intact bilaterally     Labs:  I reviewed with patient recent labs resulted on 7/10/2025.        Please note that this dictation was created using voice recognition software. I have made every reasonable attempt to correct obvious errors, but I expect that there are " errors of grammar and possibly content that I did not discover before finalizing the note.

## 2025-07-22 NOTE — ASSESSMENT & PLAN NOTE
Chronic condition, stable, although A1c has increased mildly from previous numbers.  We discussed about Rybelsus medication.  He would like to wait.  Continue metformin at same dose.  Recheck labs in 6-month